# Patient Record
Sex: MALE | Race: ASIAN | NOT HISPANIC OR LATINO | URBAN - METROPOLITAN AREA
[De-identification: names, ages, dates, MRNs, and addresses within clinical notes are randomized per-mention and may not be internally consistent; named-entity substitution may affect disease eponyms.]

---

## 2020-01-01 ENCOUNTER — INPATIENT (INPATIENT)
Facility: HOSPITAL | Age: 75
LOS: 2 days | DRG: 871 | End: 2020-04-20
Attending: INTERNAL MEDICINE | Admitting: INTERNAL MEDICINE
Payer: MEDICARE

## 2020-01-01 ENCOUNTER — INPATIENT (INPATIENT)
Facility: HOSPITAL | Age: 75
LOS: 11 days | Discharge: ANOTHER HOSPITAL | End: 2020-04-16
Attending: EMERGENCY MEDICINE | Admitting: INTERNAL MEDICINE
Payer: MEDICARE

## 2020-01-01 VITALS
OXYGEN SATURATION: 97 % | HEIGHT: 68 IN | RESPIRATION RATE: 26 BRPM | SYSTOLIC BLOOD PRESSURE: 142 MMHG | WEIGHT: 160.06 LBS | TEMPERATURE: 100 F | HEART RATE: 113 BPM | DIASTOLIC BLOOD PRESSURE: 86 MMHG

## 2020-01-01 VITALS
DIASTOLIC BLOOD PRESSURE: 71 MMHG | RESPIRATION RATE: 20 BRPM | OXYGEN SATURATION: 98 % | SYSTOLIC BLOOD PRESSURE: 151 MMHG | HEART RATE: 90 BPM

## 2020-01-01 VITALS — SYSTOLIC BLOOD PRESSURE: 97 MMHG | TEMPERATURE: 101 F | DIASTOLIC BLOOD PRESSURE: 43 MMHG | HEART RATE: 134 BPM

## 2020-01-01 VITALS
HEART RATE: 91 BPM | RESPIRATION RATE: 20 BRPM | OXYGEN SATURATION: 96 % | SYSTOLIC BLOOD PRESSURE: 104 MMHG | DIASTOLIC BLOOD PRESSURE: 60 MMHG

## 2020-01-01 DIAGNOSIS — E03.9 HYPOTHYROIDISM, UNSPECIFIED: ICD-10-CM

## 2020-01-01 DIAGNOSIS — R65.21 SEVERE SEPSIS WITH SEPTIC SHOCK: ICD-10-CM

## 2020-01-01 DIAGNOSIS — A41.9 SEPSIS, UNSPECIFIED ORGANISM: ICD-10-CM

## 2020-01-01 DIAGNOSIS — U07.1 COVID-19: ICD-10-CM

## 2020-01-01 DIAGNOSIS — E87.1 HYPO-OSMOLALITY AND HYPONATREMIA: ICD-10-CM

## 2020-01-01 DIAGNOSIS — J12.89 OTHER VIRAL PNEUMONIA: ICD-10-CM

## 2020-01-01 DIAGNOSIS — J80 ACUTE RESPIRATORY DISTRESS SYNDROME: ICD-10-CM

## 2020-01-01 DIAGNOSIS — E87.0 HYPEROSMOLALITY AND HYPERNATREMIA: ICD-10-CM

## 2020-01-01 DIAGNOSIS — E78.5 HYPERLIPIDEMIA, UNSPECIFIED: ICD-10-CM

## 2020-01-01 DIAGNOSIS — N17.0 ACUTE KIDNEY FAILURE WITH TUBULAR NECROSIS: ICD-10-CM

## 2020-01-01 DIAGNOSIS — E87.3 ALKALOSIS: ICD-10-CM

## 2020-01-01 DIAGNOSIS — J18.9 PNEUMONIA, UNSPECIFIED ORGANISM: ICD-10-CM

## 2020-01-01 DIAGNOSIS — R73.9 HYPERGLYCEMIA, UNSPECIFIED: ICD-10-CM

## 2020-01-01 DIAGNOSIS — Z29.9 ENCOUNTER FOR PROPHYLACTIC MEASURES, UNSPECIFIED: ICD-10-CM

## 2020-01-01 DIAGNOSIS — E44.0 MODERATE PROTEIN-CALORIE MALNUTRITION: ICD-10-CM

## 2020-01-01 DIAGNOSIS — Z78.1 PHYSICAL RESTRAINT STATUS: ICD-10-CM

## 2020-01-01 LAB
ALBUMIN SERPL ELPH-MCNC: 2.2 G/DL — LOW (ref 3.3–5)
ALBUMIN SERPL ELPH-MCNC: 2.5 G/DL — LOW (ref 3.3–5)
ALBUMIN SERPL ELPH-MCNC: 2.6 G/DL — LOW (ref 3.3–5)
ALBUMIN SERPL ELPH-MCNC: 2.7 G/DL — LOW (ref 3.3–5)
ALBUMIN SERPL ELPH-MCNC: 3.2 G/DL — LOW (ref 3.3–5)
ALP SERPL-CCNC: 106 U/L — SIGNIFICANT CHANGE UP (ref 40–120)
ALP SERPL-CCNC: 44 U/L — SIGNIFICANT CHANGE UP (ref 40–120)
ALP SERPL-CCNC: 60 U/L — SIGNIFICANT CHANGE UP (ref 40–120)
ALP SERPL-CCNC: 66 U/L — SIGNIFICANT CHANGE UP (ref 40–120)
ALP SERPL-CCNC: 66 U/L — SIGNIFICANT CHANGE UP (ref 40–120)
ALP SERPL-CCNC: 67 U/L — SIGNIFICANT CHANGE UP (ref 40–120)
ALP SERPL-CCNC: 72 U/L — SIGNIFICANT CHANGE UP (ref 40–120)
ALP SERPL-CCNC: 82 U/L — SIGNIFICANT CHANGE UP (ref 40–120)
ALP SERPL-CCNC: 94 U/L — SIGNIFICANT CHANGE UP (ref 40–120)
ALT FLD-CCNC: 23 U/L — SIGNIFICANT CHANGE UP (ref 12–78)
ALT FLD-CCNC: 34 U/L — SIGNIFICANT CHANGE UP (ref 12–78)
ALT FLD-CCNC: 34 U/L — SIGNIFICANT CHANGE UP (ref 12–78)
ALT FLD-CCNC: 38 U/L — SIGNIFICANT CHANGE UP (ref 12–78)
ALT FLD-CCNC: 38 U/L — SIGNIFICANT CHANGE UP (ref 12–78)
ALT FLD-CCNC: 42 U/L — SIGNIFICANT CHANGE UP (ref 12–78)
ALT FLD-CCNC: 47 U/L — SIGNIFICANT CHANGE UP (ref 12–78)
ALT FLD-CCNC: 55 U/L — SIGNIFICANT CHANGE UP (ref 12–78)
ALT FLD-CCNC: 62 U/L — SIGNIFICANT CHANGE UP (ref 12–78)
ANION GAP SERPL CALC-SCNC: 10 MMOL/L — SIGNIFICANT CHANGE UP (ref 5–17)
ANION GAP SERPL CALC-SCNC: 11 MMOL/L — SIGNIFICANT CHANGE UP (ref 5–17)
ANION GAP SERPL CALC-SCNC: 12 MMOL/L — SIGNIFICANT CHANGE UP (ref 5–17)
ANION GAP SERPL CALC-SCNC: 12 MMOL/L — SIGNIFICANT CHANGE UP (ref 5–17)
ANION GAP SERPL CALC-SCNC: 7 MMOL/L — SIGNIFICANT CHANGE UP (ref 5–17)
ANION GAP SERPL CALC-SCNC: 8 MMOL/L — SIGNIFICANT CHANGE UP (ref 5–17)
ANION GAP SERPL CALC-SCNC: 8 MMOL/L — SIGNIFICANT CHANGE UP (ref 5–17)
ANION GAP SERPL CALC-SCNC: 9 MMOL/L — SIGNIFICANT CHANGE UP (ref 5–17)
APPEARANCE UR: CLEAR — SIGNIFICANT CHANGE UP
APTT BLD: 25.4 SEC — LOW (ref 28.5–37)
APTT BLD: 30 SEC — SIGNIFICANT CHANGE UP (ref 28.5–37)
AST SERPL-CCNC: 26 U/L — SIGNIFICANT CHANGE UP (ref 15–37)
AST SERPL-CCNC: 28 U/L — SIGNIFICANT CHANGE UP (ref 15–37)
AST SERPL-CCNC: 32 U/L — SIGNIFICANT CHANGE UP (ref 15–37)
AST SERPL-CCNC: 34 U/L — SIGNIFICANT CHANGE UP (ref 15–37)
AST SERPL-CCNC: 36 U/L — SIGNIFICANT CHANGE UP (ref 15–37)
AST SERPL-CCNC: 43 U/L — HIGH (ref 15–37)
AST SERPL-CCNC: 48 U/L — HIGH (ref 15–37)
AST SERPL-CCNC: 56 U/L — HIGH (ref 15–37)
AST SERPL-CCNC: 57 U/L — HIGH (ref 15–37)
BACTERIA # UR AUTO: ABNORMAL
BASE EXCESS BLDA CALC-SCNC: -0.3 MMOL/L — SIGNIFICANT CHANGE UP (ref -2–2)
BASE EXCESS BLDA CALC-SCNC: -5.3 MMOL/L — LOW (ref -2–2)
BASE EXCESS BLDA CALC-SCNC: 1.9 MMOL/L — SIGNIFICANT CHANGE UP (ref -2–2)
BASE EXCESS BLDA CALC-SCNC: 3.4 MMOL/L — HIGH (ref -2–2)
BASE EXCESS BLDA CALC-SCNC: 4.7 MMOL/L — HIGH (ref -2–2)
BASE EXCESS BLDA CALC-SCNC: 4.8 MMOL/L — HIGH (ref -2–2)
BASE EXCESS BLDA CALC-SCNC: 5 MMOL/L — HIGH (ref -2–2)
BASE EXCESS BLDA CALC-SCNC: 8.8 MMOL/L — HIGH (ref -2–2)
BASE EXCESS BLDA CALC-SCNC: 8.9 MMOL/L — HIGH (ref -2–2)
BASOPHILS # BLD AUTO: 0 K/UL — SIGNIFICANT CHANGE UP (ref 0–0.2)
BASOPHILS # BLD AUTO: 0.01 K/UL — SIGNIFICANT CHANGE UP (ref 0–0.2)
BASOPHILS # BLD AUTO: 0.03 K/UL — SIGNIFICANT CHANGE UP (ref 0–0.2)
BASOPHILS # BLD AUTO: 0.03 K/UL — SIGNIFICANT CHANGE UP (ref 0–0.2)
BASOPHILS # BLD AUTO: 0.04 K/UL — SIGNIFICANT CHANGE UP (ref 0–0.2)
BASOPHILS # BLD AUTO: 0.04 K/UL — SIGNIFICANT CHANGE UP (ref 0–0.2)
BASOPHILS # BLD AUTO: 0.06 K/UL — SIGNIFICANT CHANGE UP (ref 0–0.2)
BASOPHILS # BLD AUTO: 0.23 K/UL — HIGH (ref 0–0.2)
BASOPHILS NFR BLD AUTO: 0 % — SIGNIFICANT CHANGE UP (ref 0–2)
BASOPHILS NFR BLD AUTO: 0.1 % — SIGNIFICANT CHANGE UP (ref 0–2)
BASOPHILS NFR BLD AUTO: 0.1 % — SIGNIFICANT CHANGE UP (ref 0–2)
BASOPHILS NFR BLD AUTO: 0.2 % — SIGNIFICANT CHANGE UP (ref 0–2)
BASOPHILS NFR BLD AUTO: 0.3 % — SIGNIFICANT CHANGE UP (ref 0–2)
BASOPHILS NFR BLD AUTO: 1.1 % — SIGNIFICANT CHANGE UP (ref 0–2)
BILIRUB DIRECT SERPL-MCNC: 0.14 MG/DL — SIGNIFICANT CHANGE UP (ref 0.05–0.2)
BILIRUB DIRECT SERPL-MCNC: 0.19 MG/DL — SIGNIFICANT CHANGE UP (ref 0.05–0.2)
BILIRUB INDIRECT FLD-MCNC: 0.3 MG/DL — SIGNIFICANT CHANGE UP (ref 0.2–1)
BILIRUB INDIRECT FLD-MCNC: 0.5 MG/DL — SIGNIFICANT CHANGE UP (ref 0.2–1)
BILIRUB SERPL-MCNC: 0.4 MG/DL — SIGNIFICANT CHANGE UP (ref 0.2–1.2)
BILIRUB SERPL-MCNC: 0.4 MG/DL — SIGNIFICANT CHANGE UP (ref 0.2–1.2)
BILIRUB SERPL-MCNC: 0.5 MG/DL — SIGNIFICANT CHANGE UP (ref 0.2–1.2)
BILIRUB SERPL-MCNC: 0.6 MG/DL — SIGNIFICANT CHANGE UP (ref 0.2–1.2)
BILIRUB SERPL-MCNC: 0.6 MG/DL — SIGNIFICANT CHANGE UP (ref 0.2–1.2)
BILIRUB SERPL-MCNC: 1 MG/DL — SIGNIFICANT CHANGE UP (ref 0.2–1.2)
BILIRUB SERPL-MCNC: 1.9 MG/DL — HIGH (ref 0.2–1.2)
BILIRUB UR-MCNC: NEGATIVE — SIGNIFICANT CHANGE UP
BLOOD GAS COMMENTS ARTERIAL: SIGNIFICANT CHANGE UP
BLOOD GAS COMMENTS: SIGNIFICANT CHANGE UP
BLOOD GAS SOURCE: SIGNIFICANT CHANGE UP
BUN SERPL-MCNC: 114 MG/DL — HIGH (ref 7–23)
BUN SERPL-MCNC: 137 MG/DL — HIGH (ref 7–23)
BUN SERPL-MCNC: 14 MG/DL — SIGNIFICANT CHANGE UP (ref 7–23)
BUN SERPL-MCNC: 15 MG/DL — SIGNIFICANT CHANGE UP (ref 7–23)
BUN SERPL-MCNC: 161 MG/DL — HIGH (ref 7–23)
BUN SERPL-MCNC: 178 MG/DL — SIGNIFICANT CHANGE UP (ref 7–23)
BUN SERPL-MCNC: 22 MG/DL — SIGNIFICANT CHANGE UP (ref 7–23)
BUN SERPL-MCNC: 23 MG/DL — SIGNIFICANT CHANGE UP (ref 7–23)
BUN SERPL-MCNC: 23 MG/DL — SIGNIFICANT CHANGE UP (ref 7–23)
BUN SERPL-MCNC: 25 MG/DL — HIGH (ref 7–23)
BUN SERPL-MCNC: 37 MG/DL — HIGH (ref 7–23)
BUN SERPL-MCNC: 55 MG/DL — HIGH (ref 7–23)
BUN SERPL-MCNC: 81 MG/DL — HIGH (ref 7–23)
CALCIUM SERPL-MCNC: 7.1 MG/DL — LOW (ref 8.5–10.1)
CALCIUM SERPL-MCNC: 7.5 MG/DL — LOW (ref 8.5–10.1)
CALCIUM SERPL-MCNC: 7.6 MG/DL — LOW (ref 8.5–10.1)
CALCIUM SERPL-MCNC: 7.7 MG/DL — LOW (ref 8.5–10.1)
CALCIUM SERPL-MCNC: 7.8 MG/DL — LOW (ref 8.5–10.1)
CALCIUM SERPL-MCNC: 7.8 MG/DL — LOW (ref 8.5–10.1)
CALCIUM SERPL-MCNC: 7.9 MG/DL — LOW (ref 8.5–10.1)
CALCIUM SERPL-MCNC: 8 MG/DL — LOW (ref 8.5–10.1)
CALCIUM SERPL-MCNC: 8 MG/DL — LOW (ref 8.5–10.1)
CALCIUM SERPL-MCNC: 8.1 MG/DL — LOW (ref 8.5–10.1)
CALCIUM SERPL-MCNC: 8.2 MG/DL — LOW (ref 8.5–10.1)
CALCIUM SERPL-MCNC: 8.2 MG/DL — LOW (ref 8.5–10.1)
CALCIUM SERPL-MCNC: 8.6 MG/DL — SIGNIFICANT CHANGE UP (ref 8.5–10.1)
CHLORIDE SERPL-SCNC: 94 MMOL/L — LOW (ref 96–108)
CHLORIDE SERPL-SCNC: 94 MMOL/L — LOW (ref 96–108)
CHLORIDE SERPL-SCNC: 95 MMOL/L — LOW (ref 96–108)
CHLORIDE SERPL-SCNC: 96 MMOL/L — SIGNIFICANT CHANGE UP (ref 96–108)
CHLORIDE SERPL-SCNC: 97 MMOL/L — SIGNIFICANT CHANGE UP (ref 96–108)
CHLORIDE SERPL-SCNC: 97 MMOL/L — SIGNIFICANT CHANGE UP (ref 96–108)
CHLORIDE SERPL-SCNC: 98 MMOL/L — SIGNIFICANT CHANGE UP (ref 96–108)
CHLORIDE SERPL-SCNC: 99 MMOL/L — SIGNIFICANT CHANGE UP (ref 96–108)
CHOLEST SERPL-MCNC: 103 MG/DL — SIGNIFICANT CHANGE UP (ref 10–199)
CHOLEST SERPL-MCNC: 117 MG/DL — SIGNIFICANT CHANGE UP (ref 10–199)
CK SERPL-CCNC: 192 U/L — SIGNIFICANT CHANGE UP (ref 26–308)
CK SERPL-CCNC: 91 U/L — SIGNIFICANT CHANGE UP (ref 26–308)
CO2 SERPL-SCNC: 22 MMOL/L — SIGNIFICANT CHANGE UP (ref 22–31)
CO2 SERPL-SCNC: 24 MMOL/L — SIGNIFICANT CHANGE UP (ref 22–31)
CO2 SERPL-SCNC: 24 MMOL/L — SIGNIFICANT CHANGE UP (ref 22–31)
CO2 SERPL-SCNC: 25 MMOL/L — SIGNIFICANT CHANGE UP (ref 22–31)
CO2 SERPL-SCNC: 25 MMOL/L — SIGNIFICANT CHANGE UP (ref 22–31)
CO2 SERPL-SCNC: 26 MMOL/L — SIGNIFICANT CHANGE UP (ref 22–31)
CO2 SERPL-SCNC: 29 MMOL/L — SIGNIFICANT CHANGE UP (ref 22–31)
CO2 SERPL-SCNC: 30 MMOL/L — SIGNIFICANT CHANGE UP (ref 22–31)
CO2 SERPL-SCNC: 32 MMOL/L — HIGH (ref 22–31)
CO2 SERPL-SCNC: 33 MMOL/L — HIGH (ref 22–31)
CO2 SERPL-SCNC: 34 MMOL/L — HIGH (ref 22–31)
CO2 SERPL-SCNC: 34 MMOL/L — HIGH (ref 22–31)
CO2 SERPL-SCNC: 35 MMOL/L — HIGH (ref 22–31)
COLOR SPEC: YELLOW — SIGNIFICANT CHANGE UP
CREAT SERPL-MCNC: 1 MG/DL — SIGNIFICANT CHANGE UP (ref 0.5–1.3)
CREAT SERPL-MCNC: 1.01 MG/DL — SIGNIFICANT CHANGE UP (ref 0.5–1.3)
CREAT SERPL-MCNC: 1.12 MG/DL — SIGNIFICANT CHANGE UP (ref 0.5–1.3)
CREAT SERPL-MCNC: 1.13 MG/DL — SIGNIFICANT CHANGE UP (ref 0.5–1.3)
CREAT SERPL-MCNC: 1.19 MG/DL — SIGNIFICANT CHANGE UP (ref 0.5–1.3)
CREAT SERPL-MCNC: 1.35 MG/DL — HIGH (ref 0.5–1.3)
CREAT SERPL-MCNC: 1.61 MG/DL — HIGH (ref 0.5–1.3)
CREAT SERPL-MCNC: 2.42 MG/DL — HIGH (ref 0.5–1.3)
CREAT SERPL-MCNC: 3.27 MG/DL — HIGH (ref 0.5–1.3)
CREAT SERPL-MCNC: 3.42 MG/DL — HIGH (ref 0.5–1.3)
CREAT SERPL-MCNC: 3.59 MG/DL — HIGH (ref 0.5–1.3)
CREAT SERPL-MCNC: 3.76 MG/DL — HIGH (ref 0.5–1.3)
CREAT SERPL-MCNC: 4.24 MG/DL — HIGH (ref 0.5–1.3)
CRP SERPL-MCNC: 0.79 MG/DL — HIGH (ref 0–0.4)
CRP SERPL-MCNC: 1.3 MG/DL — HIGH (ref 0–0.4)
CRP SERPL-MCNC: 2.17 MG/DL — HIGH (ref 0–0.4)
CRP SERPL-MCNC: 3.25 MG/DL — HIGH (ref 0–0.4)
CRP SERPL-MCNC: 4.59 MG/DL — HIGH (ref 0–0.4)
CULTURE RESULTS: SIGNIFICANT CHANGE UP
D DIMER BLD IA.RAPID-MCNC: 174 NG/ML DDU — SIGNIFICANT CHANGE UP
D DIMER BLD IA.RAPID-MCNC: 484 NG/ML DDU — HIGH
D DIMER BLD IA.RAPID-MCNC: 632 NG/ML DDU — HIGH
D DIMER BLD IA.RAPID-MCNC: <150 NG/ML DDU — SIGNIFICANT CHANGE UP
DIFF PNL FLD: ABNORMAL
EOSINOPHIL # BLD AUTO: 0 K/UL — SIGNIFICANT CHANGE UP (ref 0–0.5)
EOSINOPHIL # BLD AUTO: 0.01 K/UL — SIGNIFICANT CHANGE UP (ref 0–0.5)
EOSINOPHIL NFR BLD AUTO: 0 % — SIGNIFICANT CHANGE UP (ref 0–6)
EOSINOPHIL NFR BLD AUTO: 0.1 % — SIGNIFICANT CHANGE UP (ref 0–6)
EPI CELLS # UR: SIGNIFICANT CHANGE UP
ERYTHROCYTE [SEDIMENTATION RATE] IN BLOOD: 39 MM/HR — HIGH (ref 0–20)
ERYTHROCYTE [SEDIMENTATION RATE] IN BLOOD: 44 MM/HR — HIGH (ref 0–20)
ERYTHROCYTE [SEDIMENTATION RATE] IN BLOOD: 45 MM/HR — HIGH (ref 0–20)
ERYTHROCYTE [SEDIMENTATION RATE] IN BLOOD: 46 MM/HR — HIGH (ref 0–20)
FERRITIN SERPL-MCNC: 1036 NG/ML — HIGH (ref 30–400)
FERRITIN SERPL-MCNC: 1112 NG/ML — HIGH (ref 30–400)
FERRITIN SERPL-MCNC: 1340 NG/ML — HIGH (ref 30–400)
FERRITIN SERPL-MCNC: 1494 NG/ML — HIGH (ref 30–400)
FERRITIN SERPL-MCNC: 1622 NG/ML — HIGH (ref 30–400)
FERRITIN SERPL-MCNC: 1725 NG/ML — HIGH (ref 30–400)
FERRITIN SERPL-MCNC: 512 NG/ML — HIGH (ref 30–400)
FERRITIN SERPL-MCNC: 934 NG/ML — HIGH (ref 30–400)
FIBRINOGEN AG PPP IA-MCNC: 312 MG/DL — SIGNIFICANT CHANGE UP
FLU A RESULT: SIGNIFICANT CHANGE UP
FLU A RESULT: SIGNIFICANT CHANGE UP
FLUAV AG NPH QL: SIGNIFICANT CHANGE UP
FLUBV AG NPH QL: SIGNIFICANT CHANGE UP
GAS PNL BLDA: SIGNIFICANT CHANGE UP
GLUCOSE BLDC GLUCOMTR-MCNC: 149 MG/DL — HIGH (ref 70–99)
GLUCOSE BLDC GLUCOMTR-MCNC: 184 MG/DL — HIGH (ref 70–99)
GLUCOSE BLDC GLUCOMTR-MCNC: 191 MG/DL — HIGH (ref 70–99)
GLUCOSE BLDC GLUCOMTR-MCNC: 207 MG/DL — HIGH (ref 70–99)
GLUCOSE BLDC GLUCOMTR-MCNC: 211 MG/DL — HIGH (ref 70–99)
GLUCOSE BLDC GLUCOMTR-MCNC: 212 MG/DL — HIGH (ref 70–99)
GLUCOSE BLDC GLUCOMTR-MCNC: 218 MG/DL — HIGH (ref 70–99)
GLUCOSE BLDC GLUCOMTR-MCNC: 238 MG/DL — HIGH (ref 70–99)
GLUCOSE BLDC GLUCOMTR-MCNC: 275 MG/DL — HIGH (ref 70–99)
GLUCOSE BLDC GLUCOMTR-MCNC: 278 MG/DL — HIGH (ref 70–99)
GLUCOSE BLDC GLUCOMTR-MCNC: 281 MG/DL — HIGH (ref 70–99)
GLUCOSE BLDC GLUCOMTR-MCNC: 283 MG/DL — HIGH (ref 70–99)
GLUCOSE BLDC GLUCOMTR-MCNC: 286 MG/DL — HIGH (ref 70–99)
GLUCOSE BLDC GLUCOMTR-MCNC: 287 MG/DL — HIGH (ref 70–99)
GLUCOSE BLDC GLUCOMTR-MCNC: 289 MG/DL — HIGH (ref 70–99)
GLUCOSE BLDC GLUCOMTR-MCNC: 289 MG/DL — HIGH (ref 70–99)
GLUCOSE BLDC GLUCOMTR-MCNC: 293 MG/DL — HIGH (ref 70–99)
GLUCOSE BLDC GLUCOMTR-MCNC: 298 MG/DL — HIGH (ref 70–99)
GLUCOSE BLDC GLUCOMTR-MCNC: 301 MG/DL — HIGH (ref 70–99)
GLUCOSE BLDC GLUCOMTR-MCNC: 302 MG/DL — HIGH (ref 70–99)
GLUCOSE BLDC GLUCOMTR-MCNC: 341 MG/DL — HIGH (ref 70–99)
GLUCOSE BLDC GLUCOMTR-MCNC: 341 MG/DL — HIGH (ref 70–99)
GLUCOSE BLDC GLUCOMTR-MCNC: 343 MG/DL — HIGH (ref 70–99)
GLUCOSE BLDC GLUCOMTR-MCNC: 356 MG/DL — HIGH (ref 70–99)
GLUCOSE BLDC GLUCOMTR-MCNC: 365 MG/DL — HIGH (ref 70–99)
GLUCOSE SERPL-MCNC: 139 MG/DL — HIGH (ref 70–99)
GLUCOSE SERPL-MCNC: 182 MG/DL — HIGH (ref 70–99)
GLUCOSE SERPL-MCNC: 201 MG/DL — HIGH (ref 70–99)
GLUCOSE SERPL-MCNC: 223 MG/DL — HIGH (ref 70–99)
GLUCOSE SERPL-MCNC: 260 MG/DL — HIGH (ref 70–99)
GLUCOSE SERPL-MCNC: 290 MG/DL — HIGH (ref 70–99)
GLUCOSE SERPL-MCNC: 305 MG/DL — HIGH (ref 70–99)
GLUCOSE SERPL-MCNC: 319 MG/DL — HIGH (ref 70–99)
GLUCOSE SERPL-MCNC: 320 MG/DL — HIGH (ref 70–99)
GLUCOSE SERPL-MCNC: 320 MG/DL — HIGH (ref 70–99)
GLUCOSE SERPL-MCNC: 331 MG/DL — HIGH (ref 70–99)
GLUCOSE SERPL-MCNC: 364 MG/DL — HIGH (ref 70–99)
GLUCOSE SERPL-MCNC: 390 MG/DL — HIGH (ref 70–99)
GLUCOSE UR QL: NEGATIVE MG/DL — SIGNIFICANT CHANGE UP
GRAN CASTS # UR COMP ASSIST: ABNORMAL /LPF
HBA1C BLD-MCNC: 7.7 % — HIGH (ref 4–5.6)
HCO3 BLDA-SCNC: 22 MMOL/L — SIGNIFICANT CHANGE UP (ref 21–29)
HCO3 BLDA-SCNC: 24 MMOL/L — SIGNIFICANT CHANGE UP (ref 21–29)
HCO3 BLDA-SCNC: 27 MMOL/L — SIGNIFICANT CHANGE UP (ref 21–29)
HCO3 BLDA-SCNC: 30 MMOL/L — HIGH (ref 21–29)
HCO3 BLDA-SCNC: 30 MMOL/L — HIGH (ref 21–29)
HCO3 BLDA-SCNC: 31 MMOL/L — HIGH (ref 21–29)
HCO3 BLDA-SCNC: 31 MMOL/L — HIGH (ref 21–29)
HCO3 BLDA-SCNC: 33 MMOL/L — HIGH (ref 21–29)
HCO3 BLDA-SCNC: 36 MMOL/L — HIGH (ref 21–29)
HCT VFR BLD CALC: 36 % — LOW (ref 39–50)
HCT VFR BLD CALC: 39.1 % — SIGNIFICANT CHANGE UP (ref 39–50)
HCT VFR BLD CALC: 40.2 % — SIGNIFICANT CHANGE UP (ref 39–50)
HCT VFR BLD CALC: 40.8 % — SIGNIFICANT CHANGE UP (ref 39–50)
HCT VFR BLD CALC: 41 % — SIGNIFICANT CHANGE UP (ref 39–50)
HCT VFR BLD CALC: 41.1 % — SIGNIFICANT CHANGE UP (ref 39–50)
HCT VFR BLD CALC: 41.2 % — SIGNIFICANT CHANGE UP (ref 39–50)
HCT VFR BLD CALC: 41.4 % — SIGNIFICANT CHANGE UP (ref 39–50)
HCT VFR BLD CALC: 41.8 % — SIGNIFICANT CHANGE UP (ref 39–50)
HCT VFR BLD CALC: 42.7 % — SIGNIFICANT CHANGE UP (ref 39–50)
HCT VFR BLD CALC: 42.8 % — SIGNIFICANT CHANGE UP (ref 39–50)
HCT VFR BLD CALC: 42.9 % — SIGNIFICANT CHANGE UP (ref 39–50)
HCT VFR BLD CALC: 42.9 % — SIGNIFICANT CHANGE UP (ref 39–50)
HCT VFR BLD CALC: 44.5 % — SIGNIFICANT CHANGE UP (ref 39–50)
HCV AB S/CO SERPL IA: 0.21 S/CO — SIGNIFICANT CHANGE UP (ref 0–0.99)
HCV AB SERPL-IMP: SIGNIFICANT CHANGE UP
HDLC SERPL-MCNC: 30 MG/DL — LOW
HDLC SERPL-MCNC: 32 MG/DL — LOW
HGB BLD-MCNC: 11.8 G/DL — LOW (ref 13–17)
HGB BLD-MCNC: 13.1 G/DL — SIGNIFICANT CHANGE UP (ref 13–17)
HGB BLD-MCNC: 13.2 G/DL — SIGNIFICANT CHANGE UP (ref 13–17)
HGB BLD-MCNC: 13.4 G/DL — SIGNIFICANT CHANGE UP (ref 13–17)
HGB BLD-MCNC: 13.5 G/DL — SIGNIFICANT CHANGE UP (ref 13–17)
HGB BLD-MCNC: 13.6 G/DL — SIGNIFICANT CHANGE UP (ref 13–17)
HGB BLD-MCNC: 13.7 G/DL — SIGNIFICANT CHANGE UP (ref 13–17)
HGB BLD-MCNC: 13.8 G/DL — SIGNIFICANT CHANGE UP (ref 13–17)
HGB BLD-MCNC: 13.8 G/DL — SIGNIFICANT CHANGE UP (ref 13–17)
HGB BLD-MCNC: 13.9 G/DL — SIGNIFICANT CHANGE UP (ref 13–17)
HGB BLD-MCNC: 14.2 G/DL — SIGNIFICANT CHANGE UP (ref 13–17)
HGB BLD-MCNC: 14.4 G/DL — SIGNIFICANT CHANGE UP (ref 13–17)
HGB BLD-MCNC: 14.6 G/DL — SIGNIFICANT CHANGE UP (ref 13–17)
HGB BLD-MCNC: 14.7 G/DL — SIGNIFICANT CHANGE UP (ref 13–17)
HIV 1 & 2 AB SERPL IA.RAPID: SIGNIFICANT CHANGE UP
HOROWITZ INDEX BLDA+IHG-RTO: 100 — SIGNIFICANT CHANGE UP
HOROWITZ INDEX BLDA+IHG-RTO: 50 — SIGNIFICANT CHANGE UP
HOROWITZ INDEX BLDA+IHG-RTO: 80 — SIGNIFICANT CHANGE UP
HOROWITZ INDEX BLDA+IHG-RTO: SIGNIFICANT CHANGE UP
IMM GRANULOCYTES NFR BLD AUTO: 0.3 % — SIGNIFICANT CHANGE UP (ref 0–1.5)
IMM GRANULOCYTES NFR BLD AUTO: 0.4 % — SIGNIFICANT CHANGE UP (ref 0–1.5)
IMM GRANULOCYTES NFR BLD AUTO: 0.9 % — SIGNIFICANT CHANGE UP (ref 0–1.5)
IMM GRANULOCYTES NFR BLD AUTO: 1.9 % — HIGH (ref 0–1.5)
IMM GRANULOCYTES NFR BLD AUTO: 2.2 % — HIGH (ref 0–1.5)
IMM GRANULOCYTES NFR BLD AUTO: 2.3 % — HIGH (ref 0–1.5)
IMM GRANULOCYTES NFR BLD AUTO: 2.9 % — HIGH (ref 0–1.5)
IMM GRANULOCYTES NFR BLD AUTO: 4.5 % — HIGH (ref 0–1.5)
IMM GRANULOCYTES NFR BLD AUTO: 4.7 % — HIGH (ref 0–1.5)
INR BLD: 1.05 RATIO — SIGNIFICANT CHANGE UP (ref 0.88–1.16)
INR BLD: 1.19 RATIO — HIGH (ref 0.88–1.16)
KETONES UR-MCNC: NEGATIVE — SIGNIFICANT CHANGE UP
LACTATE SERPL-SCNC: 1.7 MMOL/L — SIGNIFICANT CHANGE UP (ref 0.7–2)
LACTATE SERPL-SCNC: 2.5 MMOL/L — HIGH (ref 0.7–2)
LDH SERPL L TO P-CCNC: 300 U/L — HIGH (ref 50–242)
LDH SERPL L TO P-CCNC: 427 U/L — HIGH (ref 50–242)
LDH SERPL L TO P-CCNC: 520 U/L — HIGH (ref 50–242)
LDH SERPL L TO P-CCNC: 560 U/L — HIGH (ref 50–242)
LDH SERPL L TO P-CCNC: 742 U/L — HIGH (ref 50–242)
LDH SERPL L TO P-CCNC: 941 U/L — HIGH (ref 50–242)
LEUKOCYTE ESTERASE UR-ACNC: NEGATIVE — SIGNIFICANT CHANGE UP
LIPID PNL WITH DIRECT LDL SERPL: 42 MG/DL — SIGNIFICANT CHANGE UP
LIPID PNL WITH DIRECT LDL SERPL: 55 MG/DL — SIGNIFICANT CHANGE UP
LYMPHOCYTES # BLD AUTO: 0.65 K/UL — LOW (ref 1–3.3)
LYMPHOCYTES # BLD AUTO: 0.76 K/UL — LOW (ref 1–3.3)
LYMPHOCYTES # BLD AUTO: 0.76 K/UL — LOW (ref 1–3.3)
LYMPHOCYTES # BLD AUTO: 0.84 K/UL — LOW (ref 1–3.3)
LYMPHOCYTES # BLD AUTO: 0.85 K/UL — LOW (ref 1–3.3)
LYMPHOCYTES # BLD AUTO: 1.08 K/UL — SIGNIFICANT CHANGE UP (ref 1–3.3)
LYMPHOCYTES # BLD AUTO: 1.26 K/UL — SIGNIFICANT CHANGE UP (ref 1–3.3)
LYMPHOCYTES # BLD AUTO: 1.27 K/UL — SIGNIFICANT CHANGE UP (ref 1–3.3)
LYMPHOCYTES # BLD AUTO: 1.31 K/UL — SIGNIFICANT CHANGE UP (ref 1–3.3)
LYMPHOCYTES # BLD AUTO: 1.71 K/UL — SIGNIFICANT CHANGE UP (ref 1–3.3)
LYMPHOCYTES # BLD AUTO: 23.5 % — SIGNIFICANT CHANGE UP (ref 13–44)
LYMPHOCYTES # BLD AUTO: 3.6 % — LOW (ref 13–44)
LYMPHOCYTES # BLD AUTO: 5 % — LOW (ref 13–44)
LYMPHOCYTES # BLD AUTO: 6 % — LOW (ref 13–44)
LYMPHOCYTES # BLD AUTO: 7 % — LOW (ref 13–44)
LYMPHOCYTES # BLD AUTO: 7.2 % — LOW (ref 13–44)
LYMPHOCYTES # BLD AUTO: 7.2 % — LOW (ref 13–44)
LYMPHOCYTES # BLD AUTO: 7.3 % — LOW (ref 13–44)
LYMPHOCYTES # BLD AUTO: 7.4 % — LOW (ref 13–44)
LYMPHOCYTES # BLD AUTO: 8.4 % — LOW (ref 13–44)
MAGNESIUM SERPL-MCNC: 2.8 MG/DL — HIGH (ref 1.6–2.6)
MAGNESIUM SERPL-MCNC: 2.9 MG/DL — HIGH (ref 1.6–2.6)
MAGNESIUM SERPL-MCNC: 3.2 MG/DL — HIGH (ref 1.6–2.6)
MAGNESIUM SERPL-MCNC: 3.3 MG/DL — HIGH (ref 1.6–2.6)
MAGNESIUM SERPL-MCNC: 3.3 MG/DL — HIGH (ref 1.6–2.6)
MAGNESIUM SERPL-MCNC: 3.5 MG/DL — HIGH (ref 1.6–2.6)
MAGNESIUM SERPL-MCNC: 3.5 MG/DL — HIGH (ref 1.6–2.6)
MAGNESIUM SERPL-MCNC: 3.7 MG/DL — HIGH (ref 1.6–2.6)
MAGNESIUM SERPL-MCNC: 4 MG/DL — HIGH (ref 1.6–2.6)
MCHC RBC-ENTMCNC: 28.7 PG — SIGNIFICANT CHANGE UP (ref 27–34)
MCHC RBC-ENTMCNC: 28.8 PG — SIGNIFICANT CHANGE UP (ref 27–34)
MCHC RBC-ENTMCNC: 28.9 PG — SIGNIFICANT CHANGE UP (ref 27–34)
MCHC RBC-ENTMCNC: 29 PG — SIGNIFICANT CHANGE UP (ref 27–34)
MCHC RBC-ENTMCNC: 29.1 PG — SIGNIFICANT CHANGE UP (ref 27–34)
MCHC RBC-ENTMCNC: 29.1 PG — SIGNIFICANT CHANGE UP (ref 27–34)
MCHC RBC-ENTMCNC: 29.2 PG — SIGNIFICANT CHANGE UP (ref 27–34)
MCHC RBC-ENTMCNC: 29.3 PG — SIGNIFICANT CHANGE UP (ref 27–34)
MCHC RBC-ENTMCNC: 29.3 PG — SIGNIFICANT CHANGE UP (ref 27–34)
MCHC RBC-ENTMCNC: 29.4 PG — SIGNIFICANT CHANGE UP (ref 27–34)
MCHC RBC-ENTMCNC: 32 GM/DL — SIGNIFICANT CHANGE UP (ref 32–36)
MCHC RBC-ENTMCNC: 32.4 GM/DL — SIGNIFICANT CHANGE UP (ref 32–36)
MCHC RBC-ENTMCNC: 32.5 GM/DL — SIGNIFICANT CHANGE UP (ref 32–36)
MCHC RBC-ENTMCNC: 32.6 GM/DL — SIGNIFICANT CHANGE UP (ref 32–36)
MCHC RBC-ENTMCNC: 32.8 GM/DL — SIGNIFICANT CHANGE UP (ref 32–36)
MCHC RBC-ENTMCNC: 33.1 GM/DL — SIGNIFICANT CHANGE UP (ref 32–36)
MCHC RBC-ENTMCNC: 33.7 GM/DL — SIGNIFICANT CHANGE UP (ref 32–36)
MCHC RBC-ENTMCNC: 33.7 GM/DL — SIGNIFICANT CHANGE UP (ref 32–36)
MCHC RBC-ENTMCNC: 33.8 GM/DL — SIGNIFICANT CHANGE UP (ref 32–36)
MCHC RBC-ENTMCNC: 33.8 GM/DL — SIGNIFICANT CHANGE UP (ref 32–36)
MCHC RBC-ENTMCNC: 34 GM/DL — SIGNIFICANT CHANGE UP (ref 32–36)
MCHC RBC-ENTMCNC: 34.4 GM/DL — SIGNIFICANT CHANGE UP (ref 32–36)
MCV RBC AUTO: 84.6 FL — SIGNIFICANT CHANGE UP (ref 80–100)
MCV RBC AUTO: 84.9 FL — SIGNIFICANT CHANGE UP (ref 80–100)
MCV RBC AUTO: 85.2 FL — SIGNIFICANT CHANGE UP (ref 80–100)
MCV RBC AUTO: 86.4 FL — SIGNIFICANT CHANGE UP (ref 80–100)
MCV RBC AUTO: 86.5 FL — SIGNIFICANT CHANGE UP (ref 80–100)
MCV RBC AUTO: 86.7 FL — SIGNIFICANT CHANGE UP (ref 80–100)
MCV RBC AUTO: 88.1 FL — SIGNIFICANT CHANGE UP (ref 80–100)
MCV RBC AUTO: 88.3 FL — SIGNIFICANT CHANGE UP (ref 80–100)
MCV RBC AUTO: 88.8 FL — SIGNIFICANT CHANGE UP (ref 80–100)
MCV RBC AUTO: 89 FL — SIGNIFICANT CHANGE UP (ref 80–100)
MCV RBC AUTO: 89.7 FL — SIGNIFICANT CHANGE UP (ref 80–100)
MCV RBC AUTO: 89.8 FL — SIGNIFICANT CHANGE UP (ref 80–100)
MCV RBC AUTO: 89.9 FL — SIGNIFICANT CHANGE UP (ref 80–100)
MCV RBC AUTO: 90.3 FL — SIGNIFICANT CHANGE UP (ref 80–100)
MONOCYTES # BLD AUTO: 0.58 K/UL — SIGNIFICANT CHANGE UP (ref 0–0.9)
MONOCYTES # BLD AUTO: 0.87 K/UL — SIGNIFICANT CHANGE UP (ref 0–0.9)
MONOCYTES # BLD AUTO: 0.92 K/UL — HIGH (ref 0–0.9)
MONOCYTES # BLD AUTO: 0.99 K/UL — HIGH (ref 0–0.9)
MONOCYTES # BLD AUTO: 1.03 K/UL — HIGH (ref 0–0.9)
MONOCYTES # BLD AUTO: 1.22 K/UL — HIGH (ref 0–0.9)
MONOCYTES # BLD AUTO: 1.29 K/UL — HIGH (ref 0–0.9)
MONOCYTES # BLD AUTO: 1.38 K/UL — HIGH (ref 0–0.9)
MONOCYTES # BLD AUTO: 1.48 K/UL — HIGH (ref 0–0.9)
MONOCYTES # BLD AUTO: 3.14 K/UL — HIGH (ref 0–0.9)
MONOCYTES NFR BLD AUTO: 10.8 % — SIGNIFICANT CHANGE UP (ref 2–14)
MONOCYTES NFR BLD AUTO: 11.6 % — SIGNIFICANT CHANGE UP (ref 2–14)
MONOCYTES NFR BLD AUTO: 12 % — SIGNIFICANT CHANGE UP (ref 2–14)
MONOCYTES NFR BLD AUTO: 13.5 % — SIGNIFICANT CHANGE UP (ref 2–14)
MONOCYTES NFR BLD AUTO: 5 % — SIGNIFICANT CHANGE UP (ref 2–14)
MONOCYTES NFR BLD AUTO: 5.2 % — SIGNIFICANT CHANGE UP (ref 2–14)
MONOCYTES NFR BLD AUTO: 6.3 % — SIGNIFICANT CHANGE UP (ref 2–14)
MONOCYTES NFR BLD AUTO: 8.1 % — SIGNIFICANT CHANGE UP (ref 2–14)
MONOCYTES NFR BLD AUTO: 8.4 % — SIGNIFICANT CHANGE UP (ref 2–14)
MONOCYTES NFR BLD AUTO: 8.6 % — SIGNIFICANT CHANGE UP (ref 2–14)
NEUTROPHILS # BLD AUTO: 10 K/UL — HIGH (ref 1.8–7.4)
NEUTROPHILS # BLD AUTO: 10.13 K/UL — HIGH (ref 1.8–7.4)
NEUTROPHILS # BLD AUTO: 12.42 K/UL — HIGH (ref 1.8–7.4)
NEUTROPHILS # BLD AUTO: 14.27 K/UL — HIGH (ref 1.8–7.4)
NEUTROPHILS # BLD AUTO: 15.9 K/UL — HIGH (ref 1.8–7.4)
NEUTROPHILS # BLD AUTO: 16.23 K/UL — HIGH (ref 1.8–7.4)
NEUTROPHILS # BLD AUTO: 21.7 K/UL — HIGH (ref 1.8–7.4)
NEUTROPHILS # BLD AUTO: 3.5 K/UL — SIGNIFICANT CHANGE UP (ref 1.8–7.4)
NEUTROPHILS # BLD AUTO: 7.81 K/UL — HIGH (ref 1.8–7.4)
NEUTROPHILS # BLD AUTO: 9.96 K/UL — HIGH (ref 1.8–7.4)
NEUTROPHILS NFR BLD AUTO: 65.1 % — SIGNIFICANT CHANGE UP (ref 43–77)
NEUTROPHILS NFR BLD AUTO: 76.5 % — SIGNIFICANT CHANGE UP (ref 43–77)
NEUTROPHILS NFR BLD AUTO: 79.3 % — HIGH (ref 43–77)
NEUTROPHILS NFR BLD AUTO: 79.7 % — HIGH (ref 43–77)
NEUTROPHILS NFR BLD AUTO: 81 % — HIGH (ref 43–77)
NEUTROPHILS NFR BLD AUTO: 82.2 % — HIGH (ref 43–77)
NEUTROPHILS NFR BLD AUTO: 82.7 % — HIGH (ref 43–77)
NEUTROPHILS NFR BLD AUTO: 83.4 % — HIGH (ref 43–77)
NEUTROPHILS NFR BLD AUTO: 84 % — HIGH (ref 43–77)
NEUTROPHILS NFR BLD AUTO: 89 % — HIGH (ref 43–77)
NITRITE UR-MCNC: NEGATIVE — SIGNIFICANT CHANGE UP
NRBC # BLD: 0 /100 WBCS — SIGNIFICANT CHANGE UP (ref 0–0)
NRBC # BLD: SIGNIFICANT CHANGE UP /100 WBCS (ref 0–0)
NT-PROBNP SERPL-SCNC: 36 PG/ML — SIGNIFICANT CHANGE UP (ref 0–450)
PCO2 BLDA: 32 MMHG — SIGNIFICANT CHANGE UP (ref 32–46)
PCO2 BLDA: 43 MMHG — SIGNIFICANT CHANGE UP (ref 32–46)
PCO2 BLDA: 45 MMHG — SIGNIFICANT CHANGE UP (ref 32–46)
PCO2 BLDA: 48 MMHG — HIGH (ref 32–46)
PCO2 BLDA: 53 MMHG — HIGH (ref 32–46)
PCO2 BLDA: 56 MMHG — HIGH (ref 32–46)
PCO2 BLDA: 58 MMHG — HIGH (ref 32–46)
PCO2 BLDA: 59 MMHG — HIGH (ref 32–46)
PCO2 BLDA: 70 MMHG — CRITICAL HIGH (ref 32–46)
PH BLD: 7.38 — SIGNIFICANT CHANGE UP (ref 7.35–7.45)
PH BLD: 7.39 — SIGNIFICANT CHANGE UP (ref 7.35–7.45)
PH BLD: 7.4 — SIGNIFICANT CHANGE UP (ref 7.35–7.45)
PH BLD: 7.46 — HIGH (ref 7.35–7.45)
PH BLDA: 7.17 — CRITICAL LOW (ref 7.35–7.45)
PH BLDA: 7.34 — LOW (ref 7.35–7.45)
PH BLDA: 7.36 — SIGNIFICANT CHANGE UP (ref 7.35–7.45)
PH BLDA: 7.41 — SIGNIFICANT CHANGE UP (ref 7.35–7.45)
PH BLDA: 7.48 — HIGH (ref 7.35–7.45)
PH UR: 5 — SIGNIFICANT CHANGE UP (ref 5–8)
PHOSPHATE SERPL-MCNC: 2.7 MG/DL — SIGNIFICANT CHANGE UP (ref 2.5–4.5)
PHOSPHATE SERPL-MCNC: 3.8 MG/DL — SIGNIFICANT CHANGE UP (ref 2.5–4.5)
PHOSPHATE SERPL-MCNC: 5.5 MG/DL — HIGH (ref 2.5–4.5)
PHOSPHATE SERPL-MCNC: 6.2 MG/DL — HIGH (ref 2.5–4.5)
PHOSPHATE SERPL-MCNC: 6.2 MG/DL — HIGH (ref 2.5–4.5)
PHOSPHATE SERPL-MCNC: 8 MG/DL — HIGH (ref 2.5–4.5)
PHOSPHATE SERPL-MCNC: 8.5 MG/DL — HIGH (ref 2.5–4.5)
PHOSPHATE SERPL-MCNC: 8.6 MG/DL — HIGH (ref 2.5–4.5)
PHOSPHATE SERPL-MCNC: 8.9 MG/DL — HIGH (ref 2.5–4.5)
PLATELET # BLD AUTO: 114 K/UL — LOW (ref 150–400)
PLATELET # BLD AUTO: 127 K/UL — LOW (ref 150–400)
PLATELET # BLD AUTO: 132 K/UL — LOW (ref 150–400)
PLATELET # BLD AUTO: 137 K/UL — LOW (ref 150–400)
PLATELET # BLD AUTO: 150 K/UL — SIGNIFICANT CHANGE UP (ref 150–400)
PLATELET # BLD AUTO: 167 K/UL — SIGNIFICANT CHANGE UP (ref 150–400)
PLATELET # BLD AUTO: 181 K/UL — SIGNIFICANT CHANGE UP (ref 150–400)
PLATELET # BLD AUTO: 186 K/UL — SIGNIFICANT CHANGE UP (ref 150–400)
PLATELET # BLD AUTO: 187 K/UL — SIGNIFICANT CHANGE UP (ref 150–400)
PLATELET # BLD AUTO: 204 K/UL — SIGNIFICANT CHANGE UP (ref 150–400)
PLATELET # BLD AUTO: 220 K/UL — SIGNIFICANT CHANGE UP (ref 150–400)
PLATELET # BLD AUTO: 223 K/UL — SIGNIFICANT CHANGE UP (ref 150–400)
PLATELET # BLD AUTO: 231 K/UL — SIGNIFICANT CHANGE UP (ref 150–400)
PLATELET # BLD AUTO: 265 K/UL — SIGNIFICANT CHANGE UP (ref 150–400)
PO2 BLDA: 111 MMHG — HIGH (ref 74–108)
PO2 BLDA: 111 MMHG — HIGH (ref 74–108)
PO2 BLDA: 144 MMHG — HIGH (ref 74–108)
PO2 BLDA: 157 MMHG — HIGH (ref 74–108)
PO2 BLDA: 64 MMHG — LOW (ref 74–108)
PO2 BLDA: 68 MMHG — LOW (ref 74–108)
PO2 BLDA: 78 MMHG — SIGNIFICANT CHANGE UP (ref 74–108)
PO2 BLDA: 87 MMHG — SIGNIFICANT CHANGE UP (ref 74–108)
PO2 BLDA: 95 MMHG — SIGNIFICANT CHANGE UP (ref 74–108)
POTASSIUM SERPL-MCNC: 3.7 MMOL/L — SIGNIFICANT CHANGE UP (ref 3.5–5.3)
POTASSIUM SERPL-MCNC: 4 MMOL/L — SIGNIFICANT CHANGE UP (ref 3.5–5.3)
POTASSIUM SERPL-MCNC: 4.1 MMOL/L — SIGNIFICANT CHANGE UP (ref 3.5–5.3)
POTASSIUM SERPL-MCNC: 4.1 MMOL/L — SIGNIFICANT CHANGE UP (ref 3.5–5.3)
POTASSIUM SERPL-MCNC: 4.2 MMOL/L — SIGNIFICANT CHANGE UP (ref 3.5–5.3)
POTASSIUM SERPL-MCNC: 4.3 MMOL/L — SIGNIFICANT CHANGE UP (ref 3.5–5.3)
POTASSIUM SERPL-MCNC: 4.4 MMOL/L — SIGNIFICANT CHANGE UP (ref 3.5–5.3)
POTASSIUM SERPL-MCNC: 4.5 MMOL/L — SIGNIFICANT CHANGE UP (ref 3.5–5.3)
POTASSIUM SERPL-MCNC: 4.5 MMOL/L — SIGNIFICANT CHANGE UP (ref 3.5–5.3)
POTASSIUM SERPL-MCNC: 4.6 MMOL/L — SIGNIFICANT CHANGE UP (ref 3.5–5.3)
POTASSIUM SERPL-MCNC: 4.7 MMOL/L — SIGNIFICANT CHANGE UP (ref 3.5–5.3)
POTASSIUM SERPL-MCNC: 5.1 MMOL/L — SIGNIFICANT CHANGE UP (ref 3.5–5.3)
POTASSIUM SERPL-MCNC: 5.2 MMOL/L — SIGNIFICANT CHANGE UP (ref 3.5–5.3)
POTASSIUM SERPL-SCNC: 3.7 MMOL/L — SIGNIFICANT CHANGE UP (ref 3.5–5.3)
POTASSIUM SERPL-SCNC: 4 MMOL/L — SIGNIFICANT CHANGE UP (ref 3.5–5.3)
POTASSIUM SERPL-SCNC: 4.1 MMOL/L — SIGNIFICANT CHANGE UP (ref 3.5–5.3)
POTASSIUM SERPL-SCNC: 4.1 MMOL/L — SIGNIFICANT CHANGE UP (ref 3.5–5.3)
POTASSIUM SERPL-SCNC: 4.2 MMOL/L — SIGNIFICANT CHANGE UP (ref 3.5–5.3)
POTASSIUM SERPL-SCNC: 4.3 MMOL/L — SIGNIFICANT CHANGE UP (ref 3.5–5.3)
POTASSIUM SERPL-SCNC: 4.4 MMOL/L — SIGNIFICANT CHANGE UP (ref 3.5–5.3)
POTASSIUM SERPL-SCNC: 4.5 MMOL/L — SIGNIFICANT CHANGE UP (ref 3.5–5.3)
POTASSIUM SERPL-SCNC: 4.5 MMOL/L — SIGNIFICANT CHANGE UP (ref 3.5–5.3)
POTASSIUM SERPL-SCNC: 4.6 MMOL/L — SIGNIFICANT CHANGE UP (ref 3.5–5.3)
POTASSIUM SERPL-SCNC: 4.7 MMOL/L — SIGNIFICANT CHANGE UP (ref 3.5–5.3)
POTASSIUM SERPL-SCNC: 5.1 MMOL/L — SIGNIFICANT CHANGE UP (ref 3.5–5.3)
POTASSIUM SERPL-SCNC: 5.2 MMOL/L — SIGNIFICANT CHANGE UP (ref 3.5–5.3)
PROCALCITONIN SERPL-MCNC: 0.07 NG/ML — SIGNIFICANT CHANGE UP (ref 0.02–0.1)
PROCALCITONIN SERPL-MCNC: 0.15 NG/ML — HIGH (ref 0.02–0.1)
PROT SERPL-MCNC: 6.7 GM/DL — SIGNIFICANT CHANGE UP (ref 6–8.3)
PROT SERPL-MCNC: 6.8 GM/DL — SIGNIFICANT CHANGE UP (ref 6–8.3)
PROT SERPL-MCNC: 7.1 GM/DL — SIGNIFICANT CHANGE UP (ref 6–8.3)
PROT SERPL-MCNC: 7.2 GM/DL — SIGNIFICANT CHANGE UP (ref 6–8.3)
PROT SERPL-MCNC: 7.4 GM/DL — SIGNIFICANT CHANGE UP (ref 6–8.3)
PROT SERPL-MCNC: 7.6 GM/DL — SIGNIFICANT CHANGE UP (ref 6–8.3)
PROT UR-MCNC: 100 MG/DL
PROTHROM AB SERPL-ACNC: 11.8 SEC — SIGNIFICANT CHANGE UP (ref 10–12.9)
PROTHROM AB SERPL-ACNC: 13.4 SEC — HIGH (ref 10–12.9)
RBC # BLD: 4.01 M/UL — LOW (ref 4.2–5.8)
RBC # BLD: 4.47 M/UL — SIGNIFICANT CHANGE UP (ref 4.2–5.8)
RBC # BLD: 4.51 M/UL — SIGNIFICANT CHANGE UP (ref 4.2–5.8)
RBC # BLD: 4.62 M/UL — SIGNIFICANT CHANGE UP (ref 4.2–5.8)
RBC # BLD: 4.66 M/UL — SIGNIFICANT CHANGE UP (ref 4.2–5.8)
RBC # BLD: 4.69 M/UL — SIGNIFICANT CHANGE UP (ref 4.2–5.8)
RBC # BLD: 4.74 M/UL — SIGNIFICANT CHANGE UP (ref 4.2–5.8)
RBC # BLD: 4.74 M/UL — SIGNIFICANT CHANGE UP (ref 4.2–5.8)
RBC # BLD: 4.77 M/UL — SIGNIFICANT CHANGE UP (ref 4.2–5.8)
RBC # BLD: 4.79 M/UL — SIGNIFICANT CHANGE UP (ref 4.2–5.8)
RBC # BLD: 4.87 M/UL — SIGNIFICANT CHANGE UP (ref 4.2–5.8)
RBC # BLD: 5.01 M/UL — SIGNIFICANT CHANGE UP (ref 4.2–5.8)
RBC # BLD: 5.03 M/UL — SIGNIFICANT CHANGE UP (ref 4.2–5.8)
RBC # BLD: 5.07 M/UL — SIGNIFICANT CHANGE UP (ref 4.2–5.8)
RBC # FLD: 12 % — SIGNIFICANT CHANGE UP (ref 10.3–14.5)
RBC # FLD: 12.2 % — SIGNIFICANT CHANGE UP (ref 10.3–14.5)
RBC # FLD: 12.4 % — SIGNIFICANT CHANGE UP (ref 10.3–14.5)
RBC # FLD: 12.5 % — SIGNIFICANT CHANGE UP (ref 10.3–14.5)
RBC # FLD: 12.5 % — SIGNIFICANT CHANGE UP (ref 10.3–14.5)
RBC # FLD: 12.6 % — SIGNIFICANT CHANGE UP (ref 10.3–14.5)
RBC # FLD: 12.7 % — SIGNIFICANT CHANGE UP (ref 10.3–14.5)
RBC # FLD: 12.7 % — SIGNIFICANT CHANGE UP (ref 10.3–14.5)
RBC # FLD: 12.8 % — SIGNIFICANT CHANGE UP (ref 10.3–14.5)
RBC # FLD: 13.1 % — SIGNIFICANT CHANGE UP (ref 10.3–14.5)
RBC # FLD: 13.2 % — SIGNIFICANT CHANGE UP (ref 10.3–14.5)
RBC # FLD: 13.8 % — SIGNIFICANT CHANGE UP (ref 10.3–14.5)
RBC CASTS # UR COMP ASSIST: SIGNIFICANT CHANGE UP /HPF (ref 0–4)
RSV RESULT: SIGNIFICANT CHANGE UP
RSV RNA RESP QL NAA+PROBE: SIGNIFICANT CHANGE UP
SAO2 % BLDA: 59 % — LOW (ref 92–96)
SAO2 % BLDA: 92 % — SIGNIFICANT CHANGE UP (ref 92–96)
SAO2 % BLDA: 93 % — SIGNIFICANT CHANGE UP (ref 92–96)
SAO2 % BLDA: 93 % — SIGNIFICANT CHANGE UP (ref 92–96)
SAO2 % BLDA: 95 % — SIGNIFICANT CHANGE UP (ref 92–96)
SAO2 % BLDA: 97 % — HIGH (ref 92–96)
SAO2 % BLDA: 98 % — HIGH (ref 92–96)
SAO2 % BLDA: 98 % — HIGH (ref 92–96)
SAO2 % BLDA: 99 % — HIGH (ref 92–96)
SARS-COV-2 RNA SPEC QL NAA+PROBE: DETECTED
SODIUM SERPL-SCNC: 127 MMOL/L — LOW (ref 135–145)
SODIUM SERPL-SCNC: 128 MMOL/L — LOW (ref 135–145)
SODIUM SERPL-SCNC: 129 MMOL/L — LOW (ref 135–145)
SODIUM SERPL-SCNC: 130 MMOL/L — LOW (ref 135–145)
SODIUM SERPL-SCNC: 132 MMOL/L — LOW (ref 135–145)
SODIUM SERPL-SCNC: 133 MMOL/L — LOW (ref 135–145)
SODIUM SERPL-SCNC: 135 MMOL/L — SIGNIFICANT CHANGE UP (ref 135–145)
SODIUM SERPL-SCNC: 136 MMOL/L — SIGNIFICANT CHANGE UP (ref 135–145)
SODIUM SERPL-SCNC: 138 MMOL/L — SIGNIFICANT CHANGE UP (ref 135–145)
SODIUM SERPL-SCNC: 139 MMOL/L — SIGNIFICANT CHANGE UP (ref 135–145)
SODIUM SERPL-SCNC: 139 MMOL/L — SIGNIFICANT CHANGE UP (ref 135–145)
SODIUM SERPL-SCNC: 141 MMOL/L — SIGNIFICANT CHANGE UP (ref 135–145)
SODIUM SERPL-SCNC: 141 MMOL/L — SIGNIFICANT CHANGE UP (ref 135–145)
SP GR SPEC: 1.01 — SIGNIFICANT CHANGE UP (ref 1.01–1.02)
SPECIMEN SOURCE: SIGNIFICANT CHANGE UP
TOTAL CHOLESTEROL/HDL RATIO MEASUREMENT: 3.4 RATIO — SIGNIFICANT CHANGE UP (ref 3.4–9.6)
TOTAL CHOLESTEROL/HDL RATIO MEASUREMENT: 3.6 RATIO — SIGNIFICANT CHANGE UP (ref 3.4–9.6)
TRIGL SERPL-MCNC: 148 MG/DL — SIGNIFICANT CHANGE UP (ref 10–149)
TRIGL SERPL-MCNC: 152 MG/DL — HIGH (ref 10–149)
TROPONIN I SERPL-MCNC: 0.07 NG/ML — HIGH (ref 0.01–0.04)
TROPONIN I SERPL-MCNC: <.015 NG/ML — SIGNIFICANT CHANGE UP (ref 0.01–0.04)
TSH SERPL-MCNC: 0.15 UIU/ML — LOW (ref 0.36–3.74)
UROBILINOGEN FLD QL: NEGATIVE MG/DL — SIGNIFICANT CHANGE UP
WBC # BLD: 10.21 K/UL — SIGNIFICANT CHANGE UP (ref 3.8–10.5)
WBC # BLD: 11.46 K/UL — HIGH (ref 3.8–10.5)
WBC # BLD: 11.85 K/UL — HIGH (ref 3.8–10.5)
WBC # BLD: 11.99 K/UL — HIGH (ref 3.8–10.5)
WBC # BLD: 12.77 K/UL — HIGH (ref 3.8–10.5)
WBC # BLD: 15.09 K/UL — HIGH (ref 3.8–10.5)
WBC # BLD: 17.28 K/UL — HIGH (ref 3.8–10.5)
WBC # BLD: 17.86 K/UL — HIGH (ref 3.8–10.5)
WBC # BLD: 19.76 K/UL — HIGH (ref 3.8–10.5)
WBC # BLD: 20.38 K/UL — HIGH (ref 3.8–10.5)
WBC # BLD: 26.14 K/UL — HIGH (ref 3.8–10.5)
WBC # BLD: 5.37 K/UL — SIGNIFICANT CHANGE UP (ref 3.8–10.5)
WBC # BLD: 7.24 K/UL — SIGNIFICANT CHANGE UP (ref 3.8–10.5)
WBC # BLD: 8.91 K/UL — SIGNIFICANT CHANGE UP (ref 3.8–10.5)
WBC # FLD AUTO: 10.21 K/UL — SIGNIFICANT CHANGE UP (ref 3.8–10.5)
WBC # FLD AUTO: 11.46 K/UL — HIGH (ref 3.8–10.5)
WBC # FLD AUTO: 11.85 K/UL — HIGH (ref 3.8–10.5)
WBC # FLD AUTO: 11.99 K/UL — HIGH (ref 3.8–10.5)
WBC # FLD AUTO: 12.77 K/UL — HIGH (ref 3.8–10.5)
WBC # FLD AUTO: 15.09 K/UL — HIGH (ref 3.8–10.5)
WBC # FLD AUTO: 17.28 K/UL — HIGH (ref 3.8–10.5)
WBC # FLD AUTO: 17.86 K/UL — HIGH (ref 3.8–10.5)
WBC # FLD AUTO: 19.76 K/UL — HIGH (ref 3.8–10.5)
WBC # FLD AUTO: 20.38 K/UL — HIGH (ref 3.8–10.5)
WBC # FLD AUTO: 26.14 K/UL — HIGH (ref 3.8–10.5)
WBC # FLD AUTO: 5.37 K/UL — SIGNIFICANT CHANGE UP (ref 3.8–10.5)
WBC # FLD AUTO: 7.24 K/UL — SIGNIFICANT CHANGE UP (ref 3.8–10.5)
WBC # FLD AUTO: 8.91 K/UL — SIGNIFICANT CHANGE UP (ref 3.8–10.5)
WBC UR QL: SIGNIFICANT CHANGE UP

## 2020-01-01 PROCEDURE — 85025 COMPLETE CBC W/AUTO DIFF WBC: CPT

## 2020-01-01 PROCEDURE — 71045 X-RAY EXAM CHEST 1 VIEW: CPT | Mod: 26

## 2020-01-01 PROCEDURE — 76937 US GUIDE VASCULAR ACCESS: CPT | Mod: 26

## 2020-01-01 PROCEDURE — 99291 CRITICAL CARE FIRST HOUR: CPT

## 2020-01-01 PROCEDURE — 93010 ELECTROCARDIOGRAM REPORT: CPT

## 2020-01-01 PROCEDURE — 36620 INSERTION CATHETER ARTERY: CPT | Mod: CS

## 2020-01-01 PROCEDURE — 93970 EXTREMITY STUDY: CPT

## 2020-01-01 PROCEDURE — 99232 SBSQ HOSP IP/OBS MODERATE 35: CPT

## 2020-01-01 PROCEDURE — 36415 COLL VENOUS BLD VENIPUNCTURE: CPT

## 2020-01-01 PROCEDURE — C9113: CPT

## 2020-01-01 PROCEDURE — 36600 WITHDRAWAL OF ARTERIAL BLOOD: CPT

## 2020-01-01 PROCEDURE — 93005 ELECTROCARDIOGRAM TRACING: CPT

## 2020-01-01 PROCEDURE — 76937 US GUIDE VASCULAR ACCESS: CPT | Mod: 26,CS

## 2020-01-01 PROCEDURE — 71045 X-RAY EXAM CHEST 1 VIEW: CPT

## 2020-01-01 PROCEDURE — 99291 CRITICAL CARE FIRST HOUR: CPT | Mod: CS

## 2020-01-01 PROCEDURE — 94002 VENT MGMT INPAT INIT DAY: CPT

## 2020-01-01 PROCEDURE — 83735 ASSAY OF MAGNESIUM: CPT

## 2020-01-01 PROCEDURE — 84100 ASSAY OF PHOSPHORUS: CPT

## 2020-01-01 PROCEDURE — 36556 INSERT NON-TUNNEL CV CATH: CPT | Mod: LT

## 2020-01-01 PROCEDURE — 93970 EXTREMITY STUDY: CPT | Mod: 26

## 2020-01-01 PROCEDURE — 94003 VENT MGMT INPAT SUBQ DAY: CPT

## 2020-01-01 PROCEDURE — 85027 COMPLETE CBC AUTOMATED: CPT

## 2020-01-01 PROCEDURE — 82803 BLOOD GASES ANY COMBINATION: CPT

## 2020-01-01 PROCEDURE — 82962 GLUCOSE BLOOD TEST: CPT

## 2020-01-01 PROCEDURE — 99233 SBSQ HOSP IP/OBS HIGH 50: CPT

## 2020-01-01 PROCEDURE — 71045 X-RAY EXAM CHEST 1 VIEW: CPT | Mod: 26,CS

## 2020-01-01 PROCEDURE — 99222 1ST HOSP IP/OBS MODERATE 55: CPT

## 2020-01-01 PROCEDURE — 12345: CPT | Mod: NC

## 2020-01-01 PROCEDURE — 99292 CRITICAL CARE ADDL 30 MIN: CPT

## 2020-01-01 PROCEDURE — 80053 COMPREHEN METABOLIC PANEL: CPT

## 2020-01-01 RX ORDER — ATAZANAVIR 200 MG/1
300 CAPSULE ORAL DAILY
Refills: 0 | Status: DISCONTINUED | OUTPATIENT
Start: 2020-01-01 | End: 2020-01-01

## 2020-01-01 RX ORDER — FENTANYL CITRATE 50 UG/ML
1 INJECTION INTRAVENOUS
Qty: 5000 | Refills: 0 | Status: DISCONTINUED | OUTPATIENT
Start: 2020-01-01 | End: 2020-01-01

## 2020-01-01 RX ORDER — INSULIN GLARGINE 100 [IU]/ML
30 INJECTION, SOLUTION SUBCUTANEOUS
Refills: 0 | Status: DISCONTINUED | OUTPATIENT
Start: 2020-01-01 | End: 2020-01-01

## 2020-01-01 RX ORDER — MIDODRINE HYDROCHLORIDE 2.5 MG/1
10 TABLET ORAL EVERY 8 HOURS
Refills: 0 | Status: DISCONTINUED | OUTPATIENT
Start: 2020-01-01 | End: 2020-01-01

## 2020-01-01 RX ORDER — CISATRACURIUM BESYLATE 2 MG/ML
3 INJECTION INTRAVENOUS
Qty: 200 | Refills: 0 | Status: DISCONTINUED | OUTPATIENT
Start: 2020-01-01 | End: 2020-01-01

## 2020-01-01 RX ORDER — DEXTROSE 50 % IN WATER 50 %
12.5 SYRINGE (ML) INTRAVENOUS ONCE
Refills: 0 | Status: COMPLETED | OUTPATIENT
Start: 2020-01-01 | End: 2020-01-01

## 2020-01-01 RX ORDER — ATAZANAVIR 200 MG/1
300 CAPSULE ORAL DAILY
Refills: 0 | Status: COMPLETED | OUTPATIENT
Start: 2020-01-01 | End: 2020-01-01

## 2020-01-01 RX ORDER — FENTANYL CITRATE 50 UG/ML
0.5 INJECTION INTRAVENOUS
Qty: 2500 | Refills: 0 | Status: DISCONTINUED | OUTPATIENT
Start: 2020-01-01 | End: 2020-01-01

## 2020-01-01 RX ORDER — INSULIN LISPRO 100/ML
VIAL (ML) SUBCUTANEOUS EVERY 6 HOURS
Refills: 0 | Status: DISCONTINUED | OUTPATIENT
Start: 2020-01-01 | End: 2020-01-01

## 2020-01-01 RX ORDER — DEXTROSE 50 % IN WATER 50 %
25 SYRINGE (ML) INTRAVENOUS ONCE
Refills: 0 | Status: DISCONTINUED | OUTPATIENT
Start: 2020-01-01 | End: 2020-01-01

## 2020-01-01 RX ORDER — CHLORHEXIDINE GLUCONATE 213 G/1000ML
15 SOLUTION TOPICAL EVERY 12 HOURS
Refills: 0 | Status: DISCONTINUED | OUTPATIENT
Start: 2020-01-01 | End: 2020-01-01

## 2020-01-01 RX ORDER — HYDROXYCHLOROQUINE SULFATE 200 MG
200 TABLET ORAL EVERY 12 HOURS
Refills: 0 | Status: COMPLETED | OUTPATIENT
Start: 2020-01-01 | End: 2020-01-01

## 2020-01-01 RX ORDER — GLUCAGON INJECTION, SOLUTION 0.5 MG/.1ML
1 INJECTION, SOLUTION SUBCUTANEOUS ONCE
Refills: 0 | Status: DISCONTINUED | OUTPATIENT
Start: 2020-01-01 | End: 2020-01-01

## 2020-01-01 RX ORDER — VASOPRESSIN 20 [USP'U]/ML
0.04 INJECTION INTRAVENOUS
Qty: 50 | Refills: 0 | Status: DISCONTINUED | OUTPATIENT
Start: 2020-01-01 | End: 2020-01-01

## 2020-01-01 RX ORDER — BUMETANIDE 0.25 MG/ML
1 INJECTION INTRAMUSCULAR; INTRAVENOUS
Qty: 20 | Refills: 0 | Status: DISCONTINUED | OUTPATIENT
Start: 2020-01-01 | End: 2020-01-01

## 2020-01-01 RX ORDER — LEVOTHYROXINE SODIUM 125 MCG
25 TABLET ORAL AT BEDTIME
Refills: 0 | Status: DISCONTINUED | OUTPATIENT
Start: 2020-01-01 | End: 2020-01-01

## 2020-01-01 RX ORDER — PANTOPRAZOLE SODIUM 20 MG/1
40 TABLET, DELAYED RELEASE ORAL DAILY
Refills: 0 | Status: DISCONTINUED | OUTPATIENT
Start: 2020-01-01 | End: 2020-01-01

## 2020-01-01 RX ORDER — SODIUM CHLORIDE 9 MG/ML
1000 INJECTION, SOLUTION INTRAVENOUS ONCE
Refills: 0 | Status: COMPLETED | OUTPATIENT
Start: 2020-01-01 | End: 2020-01-01

## 2020-01-01 RX ORDER — INSULIN GLARGINE 100 [IU]/ML
25 INJECTION, SOLUTION SUBCUTANEOUS
Refills: 0 | Status: DISCONTINUED | OUTPATIENT
Start: 2020-01-01 | End: 2020-01-01

## 2020-01-01 RX ORDER — MIDAZOLAM HYDROCHLORIDE 1 MG/ML
4 INJECTION, SOLUTION INTRAMUSCULAR; INTRAVENOUS ONCE
Refills: 0 | Status: DISCONTINUED | OUTPATIENT
Start: 2020-01-01 | End: 2020-01-01

## 2020-01-01 RX ORDER — LABETALOL HCL 100 MG
5 TABLET ORAL ONCE
Refills: 0 | Status: COMPLETED | OUTPATIENT
Start: 2020-01-01 | End: 2020-01-01

## 2020-01-01 RX ORDER — SODIUM CHLORIDE 9 MG/ML
1000 INJECTION, SOLUTION INTRAVENOUS
Refills: 0 | Status: DISCONTINUED | OUTPATIENT
Start: 2020-01-01 | End: 2020-01-01

## 2020-01-01 RX ORDER — ACETAMINOPHEN 500 MG
650 TABLET ORAL EVERY 4 HOURS
Refills: 0 | Status: DISCONTINUED | OUTPATIENT
Start: 2020-01-01 | End: 2020-01-01

## 2020-01-01 RX ORDER — INSULIN GLARGINE 100 [IU]/ML
14 INJECTION, SOLUTION SUBCUTANEOUS EVERY MORNING
Refills: 0 | Status: DISCONTINUED | OUTPATIENT
Start: 2020-01-01 | End: 2020-01-01

## 2020-01-01 RX ORDER — DEXTROSE 50 % IN WATER 50 %
15 SYRINGE (ML) INTRAVENOUS ONCE
Refills: 0 | Status: DISCONTINUED | OUTPATIENT
Start: 2020-01-01 | End: 2020-01-01

## 2020-01-01 RX ORDER — ACETAMINOPHEN 500 MG
650 TABLET ORAL EVERY 6 HOURS
Refills: 0 | Status: DISCONTINUED | OUTPATIENT
Start: 2020-01-01 | End: 2020-01-01

## 2020-01-01 RX ORDER — FENTANYL CITRATE 50 UG/ML
3 INJECTION INTRAVENOUS
Qty: 5000 | Refills: 0 | Status: DISCONTINUED | OUTPATIENT
Start: 2020-01-01 | End: 2020-01-01

## 2020-01-01 RX ORDER — AZITHROMYCIN 500 MG/1
500 TABLET, FILM COATED ORAL EVERY 24 HOURS
Refills: 0 | Status: COMPLETED | OUTPATIENT
Start: 2020-01-01 | End: 2020-01-01

## 2020-01-01 RX ORDER — MIDAZOLAM HYDROCHLORIDE 1 MG/ML
0.02 INJECTION, SOLUTION INTRAMUSCULAR; INTRAVENOUS
Qty: 100 | Refills: 0 | Status: DISCONTINUED | OUTPATIENT
Start: 2020-01-01 | End: 2020-01-01

## 2020-01-01 RX ORDER — DEXMEDETOMIDINE HYDROCHLORIDE IN 0.9% SODIUM CHLORIDE 4 UG/ML
0.2 INJECTION INTRAVENOUS
Qty: 400 | Refills: 0 | Status: DISCONTINUED | OUTPATIENT
Start: 2020-01-01 | End: 2020-01-01

## 2020-01-01 RX ORDER — SIMVASTATIN 20 MG/1
40 TABLET, FILM COATED ORAL AT BEDTIME
Refills: 0 | Status: DISCONTINUED | OUTPATIENT
Start: 2020-01-01 | End: 2020-01-01

## 2020-01-01 RX ORDER — DEXTROSE 50 % IN WATER 50 %
12.5 SYRINGE (ML) INTRAVENOUS ONCE
Refills: 0 | Status: DISCONTINUED | OUTPATIENT
Start: 2020-01-01 | End: 2020-01-01

## 2020-01-01 RX ORDER — HEPARIN SODIUM 5000 [USP'U]/ML
5000 INJECTION INTRAVENOUS; SUBCUTANEOUS EVERY 8 HOURS
Refills: 0 | Status: DISCONTINUED | OUTPATIENT
Start: 2020-01-01 | End: 2020-01-01

## 2020-01-01 RX ORDER — NOREPINEPHRINE BITARTRATE/D5W 8 MG/250ML
0.05 PLASTIC BAG, INJECTION (ML) INTRAVENOUS
Qty: 8 | Refills: 0 | Status: DISCONTINUED | OUTPATIENT
Start: 2020-01-01 | End: 2020-01-01

## 2020-01-01 RX ORDER — SODIUM CHLORIDE 9 MG/ML
1000 INJECTION INTRAMUSCULAR; INTRAVENOUS; SUBCUTANEOUS ONCE
Refills: 0 | Status: COMPLETED | OUTPATIENT
Start: 2020-01-01 | End: 2020-01-01

## 2020-01-01 RX ORDER — ACETAMINOPHEN 500 MG
1000 TABLET ORAL ONCE
Refills: 0 | Status: COMPLETED | OUTPATIENT
Start: 2020-01-01 | End: 2020-01-01

## 2020-01-01 RX ORDER — ENOXAPARIN SODIUM 100 MG/ML
30 INJECTION SUBCUTANEOUS
Refills: 0 | Status: DISCONTINUED | OUTPATIENT
Start: 2020-01-01 | End: 2020-01-01

## 2020-01-01 RX ORDER — ROCURONIUM BROMIDE 10 MG/ML
50 VIAL (ML) INTRAVENOUS ONCE
Refills: 0 | Status: COMPLETED | OUTPATIENT
Start: 2020-01-01 | End: 2020-01-01

## 2020-01-01 RX ORDER — INSULIN GLARGINE 100 [IU]/ML
20 INJECTION, SOLUTION SUBCUTANEOUS
Refills: 0 | Status: DISCONTINUED | OUTPATIENT
Start: 2020-01-01 | End: 2020-01-01

## 2020-01-01 RX ORDER — AZITHROMYCIN 500 MG/1
500 TABLET, FILM COATED ORAL DAILY
Refills: 0 | Status: DISCONTINUED | OUTPATIENT
Start: 2020-01-01 | End: 2020-01-01

## 2020-01-01 RX ORDER — CALCIUM GLUCONATE 100 MG/ML
1 VIAL (ML) INTRAVENOUS ONCE
Refills: 0 | Status: COMPLETED | OUTPATIENT
Start: 2020-01-01 | End: 2020-01-01

## 2020-01-01 RX ORDER — INSULIN GLARGINE 100 [IU]/ML
5 INJECTION, SOLUTION SUBCUTANEOUS ONCE
Refills: 0 | Status: COMPLETED | OUTPATIENT
Start: 2020-01-01 | End: 2020-01-01

## 2020-01-01 RX ORDER — FUROSEMIDE 40 MG
40 TABLET ORAL ONCE
Refills: 0 | Status: COMPLETED | OUTPATIENT
Start: 2020-01-01 | End: 2020-01-01

## 2020-01-01 RX ORDER — AZITHROMYCIN 500 MG/1
500 TABLET, FILM COATED ORAL ONCE
Refills: 0 | Status: COMPLETED | OUTPATIENT
Start: 2020-01-01 | End: 2020-01-01

## 2020-01-01 RX ORDER — FENTANYL CITRATE 50 UG/ML
0.5 INJECTION INTRAVENOUS
Qty: 5000 | Refills: 0 | Status: DISCONTINUED | OUTPATIENT
Start: 2020-01-01 | End: 2020-01-01

## 2020-01-01 RX ORDER — ANAKINRA 100MG/0.67
100 SYRINGE (ML) SUBCUTANEOUS EVERY 6 HOURS
Refills: 0 | Status: COMPLETED | OUTPATIENT
Start: 2020-01-01 | End: 2020-01-01

## 2020-01-01 RX ORDER — POLYETHYLENE GLYCOL 3350 17 G/17G
17 POWDER, FOR SOLUTION ORAL DAILY
Refills: 0 | Status: DISCONTINUED | OUTPATIENT
Start: 2020-01-01 | End: 2020-01-01

## 2020-01-01 RX ORDER — HYDROXYCHLOROQUINE SULFATE 200 MG
400 TABLET ORAL EVERY 12 HOURS
Refills: 0 | Status: COMPLETED | OUTPATIENT
Start: 2020-01-01 | End: 2020-01-01

## 2020-01-01 RX ORDER — CHLORHEXIDINE GLUCONATE 213 G/1000ML
1 SOLUTION TOPICAL
Refills: 0 | Status: DISCONTINUED | OUTPATIENT
Start: 2020-01-01 | End: 2020-01-01

## 2020-01-01 RX ORDER — LEVOTHYROXINE SODIUM 125 MCG
25 TABLET ORAL DAILY
Refills: 0 | Status: DISCONTINUED | OUTPATIENT
Start: 2020-01-01 | End: 2020-01-01

## 2020-01-01 RX ORDER — HYDROXYCHLOROQUINE SULFATE 200 MG
TABLET ORAL
Refills: 0 | Status: COMPLETED | OUTPATIENT
Start: 2020-01-01 | End: 2020-01-01

## 2020-01-01 RX ORDER — NOREPINEPHRINE BITARTRATE/D5W 8 MG/250ML
0.05 PLASTIC BAG, INJECTION (ML) INTRAVENOUS
Qty: 16 | Refills: 0 | Status: DISCONTINUED | OUTPATIENT
Start: 2020-01-01 | End: 2020-01-01

## 2020-01-01 RX ORDER — PROPOFOL 10 MG/ML
30 INJECTION, EMULSION INTRAVENOUS
Qty: 1000 | Refills: 0 | Status: DISCONTINUED | OUTPATIENT
Start: 2020-01-01 | End: 2020-01-01

## 2020-01-01 RX ORDER — MONTELUKAST 4 MG/1
10 TABLET, CHEWABLE ORAL DAILY
Refills: 0 | Status: DISCONTINUED | OUTPATIENT
Start: 2020-01-01 | End: 2020-01-01

## 2020-01-01 RX ORDER — INSULIN LISPRO 100/ML
VIAL (ML) SUBCUTANEOUS
Refills: 0 | Status: DISCONTINUED | OUTPATIENT
Start: 2020-01-01 | End: 2020-01-01

## 2020-01-01 RX ORDER — FUROSEMIDE 40 MG
40 TABLET ORAL ONCE
Refills: 0 | Status: DISCONTINUED | OUTPATIENT
Start: 2020-01-01 | End: 2020-01-01

## 2020-01-01 RX ORDER — FUROSEMIDE 40 MG
80 TABLET ORAL ONCE
Refills: 0 | Status: COMPLETED | OUTPATIENT
Start: 2020-01-01 | End: 2020-01-01

## 2020-01-01 RX ORDER — CEFTRIAXONE 500 MG/1
1000 INJECTION, POWDER, FOR SOLUTION INTRAMUSCULAR; INTRAVENOUS ONCE
Refills: 0 | Status: COMPLETED | OUTPATIENT
Start: 2020-01-01 | End: 2020-01-01

## 2020-01-01 RX ORDER — PHENYLEPHRINE HYDROCHLORIDE 10 MG/ML
0.5 INJECTION INTRAVENOUS
Qty: 160 | Refills: 0 | Status: DISCONTINUED | OUTPATIENT
Start: 2020-01-01 | End: 2020-01-01

## 2020-01-01 RX ORDER — INSULIN GLARGINE 100 [IU]/ML
15 INJECTION, SOLUTION SUBCUTANEOUS
Refills: 0 | Status: DISCONTINUED | OUTPATIENT
Start: 2020-01-01 | End: 2020-01-01

## 2020-01-01 RX ORDER — BUMETANIDE 0.25 MG/ML
2 INJECTION INTRAMUSCULAR; INTRAVENOUS
Qty: 20 | Refills: 0 | Status: DISCONTINUED | OUTPATIENT
Start: 2020-01-01 | End: 2020-01-01

## 2020-01-01 RX ORDER — LEVOTHYROXINE SODIUM 125 MCG
50 TABLET ORAL DAILY
Refills: 0 | Status: DISCONTINUED | OUTPATIENT
Start: 2020-01-01 | End: 2020-01-01

## 2020-01-01 RX ORDER — PROPOFOL 10 MG/ML
10 INJECTION, EMULSION INTRAVENOUS
Qty: 1000 | Refills: 0 | Status: DISCONTINUED | OUTPATIENT
Start: 2020-01-01 | End: 2020-01-01

## 2020-01-01 RX ORDER — FUROSEMIDE 40 MG
20 TABLET ORAL DAILY
Refills: 0 | Status: DISCONTINUED | OUTPATIENT
Start: 2020-01-01 | End: 2020-01-01

## 2020-01-01 RX ORDER — ACETAMINOPHEN 500 MG
650 TABLET ORAL ONCE
Refills: 0 | Status: COMPLETED | OUTPATIENT
Start: 2020-01-01 | End: 2020-01-01

## 2020-01-01 RX ADMIN — VASOPRESSIN 2.4 UNIT(S)/MIN: 20 INJECTION INTRAVENOUS at 01:37

## 2020-01-01 RX ADMIN — Medication 80 MILLIGRAM(S): at 11:53

## 2020-01-01 RX ADMIN — MIDODRINE HYDROCHLORIDE 10 MILLIGRAM(S): 2.5 TABLET ORAL at 05:25

## 2020-01-01 RX ADMIN — ENOXAPARIN SODIUM 30 MILLIGRAM(S): 100 INJECTION SUBCUTANEOUS at 06:10

## 2020-01-01 RX ADMIN — PROPOFOL 4.36 MICROGRAM(S)/KG/MIN: 10 INJECTION, EMULSION INTRAVENOUS at 08:05

## 2020-01-01 RX ADMIN — BUMETANIDE 10 MG/HR: 0.25 INJECTION INTRAMUSCULAR; INTRAVENOUS at 01:12

## 2020-01-01 RX ADMIN — Medication 40 MILLIGRAM(S): at 06:05

## 2020-01-01 RX ADMIN — Medication 8: at 05:43

## 2020-01-01 RX ADMIN — CHLORHEXIDINE GLUCONATE 1 APPLICATION(S): 213 SOLUTION TOPICAL at 05:30

## 2020-01-01 RX ADMIN — Medication 4: at 05:43

## 2020-01-01 RX ADMIN — Medication 6.68 MICROGRAM(S)/KG/MIN: at 14:15

## 2020-01-01 RX ADMIN — Medication 200 MILLIGRAM(S): at 12:02

## 2020-01-01 RX ADMIN — SIMVASTATIN 40 MILLIGRAM(S): 20 TABLET, FILM COATED ORAL at 22:50

## 2020-01-01 RX ADMIN — Medication 3.34 MICROGRAM(S)/KG/MIN: at 01:37

## 2020-01-01 RX ADMIN — MIDAZOLAM HYDROCHLORIDE 1.45 MG/KG/HR: 1 INJECTION, SOLUTION INTRAMUSCULAR; INTRAVENOUS at 03:32

## 2020-01-01 RX ADMIN — POLYETHYLENE GLYCOL 3350 17 GRAM(S): 17 POWDER, FOR SOLUTION ORAL at 11:17

## 2020-01-01 RX ADMIN — Medication 650 MILLIGRAM(S): at 22:31

## 2020-01-01 RX ADMIN — INSULIN GLARGINE 14 UNIT(S): 100 INJECTION, SOLUTION SUBCUTANEOUS at 08:01

## 2020-01-01 RX ADMIN — MIDODRINE HYDROCHLORIDE 10 MILLIGRAM(S): 2.5 TABLET ORAL at 22:27

## 2020-01-01 RX ADMIN — MIDODRINE HYDROCHLORIDE 10 MILLIGRAM(S): 2.5 TABLET ORAL at 05:29

## 2020-01-01 RX ADMIN — MIDODRINE HYDROCHLORIDE 10 MILLIGRAM(S): 2.5 TABLET ORAL at 20:10

## 2020-01-01 RX ADMIN — HEPARIN SODIUM 5000 UNIT(S): 5000 INJECTION INTRAVENOUS; SUBCUTANEOUS at 14:31

## 2020-01-01 RX ADMIN — PROPOFOL 12.6 MICROGRAM(S)/KG/MIN: 10 INJECTION, EMULSION INTRAVENOUS at 15:47

## 2020-01-01 RX ADMIN — MIDODRINE HYDROCHLORIDE 10 MILLIGRAM(S): 2.5 TABLET ORAL at 21:06

## 2020-01-01 RX ADMIN — PROPOFOL 4.36 MICROGRAM(S)/KG/MIN: 10 INJECTION, EMULSION INTRAVENOUS at 12:31

## 2020-01-01 RX ADMIN — Medication 650 MILLIGRAM(S): at 15:26

## 2020-01-01 RX ADMIN — PROPOFOL 4.36 MICROGRAM(S)/KG/MIN: 10 INJECTION, EMULSION INTRAVENOUS at 20:10

## 2020-01-01 RX ADMIN — ATAZANAVIR 300 MILLIGRAM(S): 200 CAPSULE ORAL at 15:47

## 2020-01-01 RX ADMIN — Medication 50 MICROGRAM(S): at 05:47

## 2020-01-01 RX ADMIN — ATAZANAVIR 300 MILLIGRAM(S): 200 CAPSULE ORAL at 11:20

## 2020-01-01 RX ADMIN — AZITHROMYCIN 500 MILLIGRAM(S): 500 TABLET, FILM COATED ORAL at 22:31

## 2020-01-01 RX ADMIN — Medication 3.34 MICROGRAM(S)/KG/MIN: at 02:58

## 2020-01-01 RX ADMIN — Medication 100 MILLIGRAM(S): at 05:37

## 2020-01-01 RX ADMIN — ENOXAPARIN SODIUM 30 MILLIGRAM(S): 100 INJECTION SUBCUTANEOUS at 06:14

## 2020-01-01 RX ADMIN — CISATRACURIUM BESYLATE 13.1 MICROGRAM(S)/KG/MIN: 2 INJECTION INTRAVENOUS at 13:01

## 2020-01-01 RX ADMIN — Medication 100 GRAM(S): at 01:31

## 2020-01-01 RX ADMIN — Medication 200 MILLIGRAM(S): at 21:26

## 2020-01-01 RX ADMIN — Medication 650 MILLIGRAM(S): at 00:50

## 2020-01-01 RX ADMIN — MIDODRINE HYDROCHLORIDE 10 MILLIGRAM(S): 2.5 TABLET ORAL at 05:36

## 2020-01-01 RX ADMIN — Medication 8: at 23:34

## 2020-01-01 RX ADMIN — Medication 650 MILLIGRAM(S): at 22:50

## 2020-01-01 RX ADMIN — MIDODRINE HYDROCHLORIDE 10 MILLIGRAM(S): 2.5 TABLET ORAL at 05:44

## 2020-01-01 RX ADMIN — HEPARIN SODIUM 5000 UNIT(S): 5000 INJECTION INTRAVENOUS; SUBCUTANEOUS at 21:09

## 2020-01-01 RX ADMIN — Medication 10: at 17:28

## 2020-01-01 RX ADMIN — Medication 40 MILLIGRAM(S): at 05:38

## 2020-01-01 RX ADMIN — FENTANYL CITRATE 3.63 MICROGRAM(S)/KG/HR: 50 INJECTION INTRAVENOUS at 13:00

## 2020-01-01 RX ADMIN — ENOXAPARIN SODIUM 30 MILLIGRAM(S): 100 INJECTION SUBCUTANEOUS at 17:15

## 2020-01-01 RX ADMIN — CHLORHEXIDINE GLUCONATE 15 MILLILITER(S): 213 SOLUTION TOPICAL at 18:22

## 2020-01-01 RX ADMIN — PROPOFOL 4.36 MICROGRAM(S)/KG/MIN: 10 INJECTION, EMULSION INTRAVENOUS at 17:42

## 2020-01-01 RX ADMIN — Medication 6: at 17:59

## 2020-01-01 RX ADMIN — Medication 100 MILLIGRAM(S): at 23:39

## 2020-01-01 RX ADMIN — CHLORHEXIDINE GLUCONATE 1 APPLICATION(S): 213 SOLUTION TOPICAL at 06:31

## 2020-01-01 RX ADMIN — ENOXAPARIN SODIUM 30 MILLIGRAM(S): 100 INJECTION SUBCUTANEOUS at 05:56

## 2020-01-01 RX ADMIN — FENTANYL CITRATE 3.63 MICROGRAM(S)/KG/HR: 50 INJECTION INTRAVENOUS at 18:21

## 2020-01-01 RX ADMIN — CHLORHEXIDINE GLUCONATE 15 MILLILITER(S): 213 SOLUTION TOPICAL at 17:12

## 2020-01-01 RX ADMIN — Medication 650 MILLIGRAM(S): at 17:13

## 2020-01-01 RX ADMIN — MIDODRINE HYDROCHLORIDE 10 MILLIGRAM(S): 2.5 TABLET ORAL at 04:15

## 2020-01-01 RX ADMIN — HEPARIN SODIUM 5000 UNIT(S): 5000 INJECTION INTRAVENOUS; SUBCUTANEOUS at 12:20

## 2020-01-01 RX ADMIN — MIDODRINE HYDROCHLORIDE 10 MILLIGRAM(S): 2.5 TABLET ORAL at 14:20

## 2020-01-01 RX ADMIN — ENOXAPARIN SODIUM 30 MILLIGRAM(S): 100 INJECTION SUBCUTANEOUS at 17:23

## 2020-01-01 RX ADMIN — CHLORHEXIDINE GLUCONATE 1 APPLICATION(S): 213 SOLUTION TOPICAL at 06:38

## 2020-01-01 RX ADMIN — Medication 25 MICROGRAM(S): at 22:41

## 2020-01-01 RX ADMIN — PROPOFOL 4.36 MICROGRAM(S)/KG/MIN: 10 INJECTION, EMULSION INTRAVENOUS at 01:07

## 2020-01-01 RX ADMIN — Medication 200 MILLIGRAM(S): at 22:55

## 2020-01-01 RX ADMIN — Medication 100 MILLIGRAM(S): at 12:14

## 2020-01-01 RX ADMIN — CHLORHEXIDINE GLUCONATE 15 MILLILITER(S): 213 SOLUTION TOPICAL at 05:44

## 2020-01-01 RX ADMIN — CHLORHEXIDINE GLUCONATE 15 MILLILITER(S): 213 SOLUTION TOPICAL at 17:42

## 2020-01-01 RX ADMIN — CISATRACURIUM BESYLATE 13.1 MICROGRAM(S)/KG/MIN: 2 INJECTION INTRAVENOUS at 05:51

## 2020-01-01 RX ADMIN — Medication 25 MICROGRAM(S): at 22:37

## 2020-01-01 RX ADMIN — Medication 25 MICROGRAM(S): at 05:25

## 2020-01-01 RX ADMIN — Medication 200 MILLIGRAM(S): at 22:40

## 2020-01-01 RX ADMIN — MIDODRINE HYDROCHLORIDE 10 MILLIGRAM(S): 2.5 TABLET ORAL at 22:04

## 2020-01-01 RX ADMIN — ENOXAPARIN SODIUM 30 MILLIGRAM(S): 100 INJECTION SUBCUTANEOUS at 06:04

## 2020-01-01 RX ADMIN — SIMVASTATIN 40 MILLIGRAM(S): 20 TABLET, FILM COATED ORAL at 21:10

## 2020-01-01 RX ADMIN — MIDODRINE HYDROCHLORIDE 10 MILLIGRAM(S): 2.5 TABLET ORAL at 05:43

## 2020-01-01 RX ADMIN — Medication 6: at 05:37

## 2020-01-01 RX ADMIN — ENOXAPARIN SODIUM 30 MILLIGRAM(S): 100 INJECTION SUBCUTANEOUS at 16:13

## 2020-01-01 RX ADMIN — ENOXAPARIN SODIUM 30 MILLIGRAM(S): 100 INJECTION SUBCUTANEOUS at 17:06

## 2020-01-01 RX ADMIN — MIDAZOLAM HYDROCHLORIDE 1.45 MG/KG/HR: 1 INJECTION, SOLUTION INTRAMUSCULAR; INTRAVENOUS at 18:05

## 2020-01-01 RX ADMIN — Medication 40 MILLIGRAM(S): at 12:58

## 2020-01-01 RX ADMIN — Medication 6: at 11:54

## 2020-01-01 RX ADMIN — Medication 200 MILLIGRAM(S): at 11:20

## 2020-01-01 RX ADMIN — MONTELUKAST 10 MILLIGRAM(S): 4 TABLET, CHEWABLE ORAL at 12:55

## 2020-01-01 RX ADMIN — MIDODRINE HYDROCHLORIDE 10 MILLIGRAM(S): 2.5 TABLET ORAL at 14:23

## 2020-01-01 RX ADMIN — Medication 400 MILLIGRAM(S): at 12:27

## 2020-01-01 RX ADMIN — Medication 650 MILLIGRAM(S): at 03:39

## 2020-01-01 RX ADMIN — PANTOPRAZOLE SODIUM 40 MILLIGRAM(S): 20 TABLET, DELAYED RELEASE ORAL at 12:25

## 2020-01-01 RX ADMIN — Medication 20 MILLIGRAM(S): at 05:49

## 2020-01-01 RX ADMIN — Medication 40 MILLIGRAM(S): at 15:47

## 2020-01-01 RX ADMIN — CHLORHEXIDINE GLUCONATE 15 MILLILITER(S): 213 SOLUTION TOPICAL at 04:14

## 2020-01-01 RX ADMIN — Medication 8: at 23:29

## 2020-01-01 RX ADMIN — INSULIN GLARGINE 20 UNIT(S): 100 INJECTION, SOLUTION SUBCUTANEOUS at 21:25

## 2020-01-01 RX ADMIN — FENTANYL CITRATE 3.63 MICROGRAM(S)/KG/HR: 50 INJECTION INTRAVENOUS at 20:10

## 2020-01-01 RX ADMIN — SIMVASTATIN 40 MILLIGRAM(S): 20 TABLET, FILM COATED ORAL at 22:09

## 2020-01-01 RX ADMIN — ATAZANAVIR 300 MILLIGRAM(S): 200 CAPSULE ORAL at 13:55

## 2020-01-01 RX ADMIN — Medication 40 MILLIGRAM(S): at 05:46

## 2020-01-01 RX ADMIN — PROPOFOL 4.36 MICROGRAM(S)/KG/MIN: 10 INJECTION, EMULSION INTRAVENOUS at 13:00

## 2020-01-01 RX ADMIN — Medication 650 MILLIGRAM(S): at 20:10

## 2020-01-01 RX ADMIN — Medication 6.81 MICROGRAM(S)/KG/MIN: at 03:44

## 2020-01-01 RX ADMIN — ENOXAPARIN SODIUM 30 MILLIGRAM(S): 100 INJECTION SUBCUTANEOUS at 05:29

## 2020-01-01 RX ADMIN — Medication 6: at 05:50

## 2020-01-01 RX ADMIN — CHLORHEXIDINE GLUCONATE 15 MILLILITER(S): 213 SOLUTION TOPICAL at 05:37

## 2020-01-01 RX ADMIN — CHLORHEXIDINE GLUCONATE 1 APPLICATION(S): 213 SOLUTION TOPICAL at 05:44

## 2020-01-01 RX ADMIN — Medication 40 MILLIGRAM(S): at 17:59

## 2020-01-01 RX ADMIN — SODIUM CHLORIDE 1000 MILLILITER(S): 9 INJECTION INTRAMUSCULAR; INTRAVENOUS; SUBCUTANEOUS at 22:31

## 2020-01-01 RX ADMIN — Medication 2: at 17:10

## 2020-01-01 RX ADMIN — INSULIN GLARGINE 20 UNIT(S): 100 INJECTION, SOLUTION SUBCUTANEOUS at 22:23

## 2020-01-01 RX ADMIN — ATAZANAVIR 300 MILLIGRAM(S): 200 CAPSULE ORAL at 12:02

## 2020-01-01 RX ADMIN — CISATRACURIUM BESYLATE 13.1 MICROGRAM(S)/KG/MIN: 2 INJECTION INTRAVENOUS at 17:42

## 2020-01-01 RX ADMIN — MIDODRINE HYDROCHLORIDE 10 MILLIGRAM(S): 2.5 TABLET ORAL at 12:22

## 2020-01-01 RX ADMIN — POLYETHYLENE GLYCOL 3350 17 GRAM(S): 17 POWDER, FOR SOLUTION ORAL at 12:22

## 2020-01-01 RX ADMIN — MIDODRINE HYDROCHLORIDE 10 MILLIGRAM(S): 2.5 TABLET ORAL at 07:04

## 2020-01-01 RX ADMIN — Medication 4: at 17:20

## 2020-01-01 RX ADMIN — Medication 50 MICROGRAM(S): at 06:05

## 2020-01-01 RX ADMIN — CHLORHEXIDINE GLUCONATE 1 APPLICATION(S): 213 SOLUTION TOPICAL at 08:15

## 2020-01-01 RX ADMIN — PROPOFOL 12.6 MICROGRAM(S)/KG/MIN: 10 INJECTION, EMULSION INTRAVENOUS at 01:12

## 2020-01-01 RX ADMIN — CISATRACURIUM BESYLATE 13.1 MICROGRAM(S)/KG/MIN: 2 INJECTION INTRAVENOUS at 05:36

## 2020-01-01 RX ADMIN — MIDODRINE HYDROCHLORIDE 10 MILLIGRAM(S): 2.5 TABLET ORAL at 21:44

## 2020-01-01 RX ADMIN — Medication 5 MILLIGRAM(S): at 02:56

## 2020-01-01 RX ADMIN — Medication 40 MILLIGRAM(S): at 05:52

## 2020-01-01 RX ADMIN — BUMETANIDE 10 MG/HR: 0.25 INJECTION INTRAMUSCULAR; INTRAVENOUS at 21:06

## 2020-01-01 RX ADMIN — SIMVASTATIN 40 MILLIGRAM(S): 20 TABLET, FILM COATED ORAL at 21:26

## 2020-01-01 RX ADMIN — ENOXAPARIN SODIUM 30 MILLIGRAM(S): 100 INJECTION SUBCUTANEOUS at 05:52

## 2020-01-01 RX ADMIN — PANTOPRAZOLE SODIUM 40 MILLIGRAM(S): 20 TABLET, DELAYED RELEASE ORAL at 11:17

## 2020-01-01 RX ADMIN — BUMETANIDE 10 MG/HR: 0.25 INJECTION INTRAMUSCULAR; INTRAVENOUS at 16:27

## 2020-01-01 RX ADMIN — Medication 6: at 17:42

## 2020-01-01 RX ADMIN — Medication 50 MICROGRAM(S): at 06:15

## 2020-01-01 RX ADMIN — Medication 12.5 GRAM(S): at 18:20

## 2020-01-01 RX ADMIN — MIDAZOLAM HYDROCHLORIDE 1.45 MG/KG/HR: 1 INJECTION, SOLUTION INTRAMUSCULAR; INTRAVENOUS at 08:29

## 2020-01-01 RX ADMIN — PHENYLEPHRINE HYDROCHLORIDE 6.68 MICROGRAM(S)/KG/MIN: 10 INJECTION INTRAVENOUS at 14:00

## 2020-01-01 RX ADMIN — FENTANYL CITRATE 1.78 MICROGRAM(S)/KG/HR: 50 INJECTION INTRAVENOUS at 09:00

## 2020-01-01 RX ADMIN — Medication 6.68 MICROGRAM(S)/KG/MIN: at 17:30

## 2020-01-01 RX ADMIN — MIDODRINE HYDROCHLORIDE 10 MILLIGRAM(S): 2.5 TABLET ORAL at 22:50

## 2020-01-01 RX ADMIN — INSULIN GLARGINE 20 UNIT(S): 100 INJECTION, SOLUTION SUBCUTANEOUS at 07:05

## 2020-01-01 RX ADMIN — CHLORHEXIDINE GLUCONATE 15 MILLILITER(S): 213 SOLUTION TOPICAL at 18:03

## 2020-01-01 RX ADMIN — ENOXAPARIN SODIUM 30 MILLIGRAM(S): 100 INJECTION SUBCUTANEOUS at 17:11

## 2020-01-01 RX ADMIN — Medication 25 MICROGRAM(S): at 22:04

## 2020-01-01 RX ADMIN — Medication 40 MILLIGRAM(S): at 17:42

## 2020-01-01 RX ADMIN — Medication 6.68 MICROGRAM(S)/KG/MIN: at 23:43

## 2020-01-01 RX ADMIN — MIDODRINE HYDROCHLORIDE 10 MILLIGRAM(S): 2.5 TABLET ORAL at 05:38

## 2020-01-01 RX ADMIN — PROPOFOL 4.36 MICROGRAM(S)/KG/MIN: 10 INJECTION, EMULSION INTRAVENOUS at 18:03

## 2020-01-01 RX ADMIN — Medication 25 MICROGRAM(S): at 07:05

## 2020-01-01 RX ADMIN — ENOXAPARIN SODIUM 30 MILLIGRAM(S): 100 INJECTION SUBCUTANEOUS at 17:20

## 2020-01-01 RX ADMIN — Medication 40 MILLIGRAM(S): at 16:13

## 2020-01-01 RX ADMIN — SIMVASTATIN 40 MILLIGRAM(S): 20 TABLET, FILM COATED ORAL at 22:40

## 2020-01-01 RX ADMIN — PROPOFOL 4.36 MICROGRAM(S)/KG/MIN: 10 INJECTION, EMULSION INTRAVENOUS at 22:20

## 2020-01-01 RX ADMIN — MIDAZOLAM HYDROCHLORIDE 1.45 MG/KG/HR: 1 INJECTION, SOLUTION INTRAMUSCULAR; INTRAVENOUS at 00:58

## 2020-01-01 RX ADMIN — ENOXAPARIN SODIUM 30 MILLIGRAM(S): 100 INJECTION SUBCUTANEOUS at 17:42

## 2020-01-01 RX ADMIN — POLYETHYLENE GLYCOL 3350 17 GRAM(S): 17 POWDER, FOR SOLUTION ORAL at 14:31

## 2020-01-01 RX ADMIN — CHLORHEXIDINE GLUCONATE 15 MILLILITER(S): 213 SOLUTION TOPICAL at 17:28

## 2020-01-01 RX ADMIN — Medication 4: at 23:53

## 2020-01-01 RX ADMIN — SIMVASTATIN 40 MILLIGRAM(S): 20 TABLET, FILM COATED ORAL at 22:21

## 2020-01-01 RX ADMIN — CHLORHEXIDINE GLUCONATE 15 MILLILITER(S): 213 SOLUTION TOPICAL at 05:43

## 2020-01-01 RX ADMIN — CHLORHEXIDINE GLUCONATE 1 APPLICATION(S): 213 SOLUTION TOPICAL at 04:00

## 2020-01-01 RX ADMIN — Medication 50 MICROGRAM(S): at 05:16

## 2020-01-01 RX ADMIN — CHLORHEXIDINE GLUCONATE 1 APPLICATION(S): 213 SOLUTION TOPICAL at 07:49

## 2020-01-01 RX ADMIN — BUMETANIDE 5 MG/HR: 0.25 INJECTION INTRAMUSCULAR; INTRAVENOUS at 01:59

## 2020-01-01 RX ADMIN — MIDODRINE HYDROCHLORIDE 10 MILLIGRAM(S): 2.5 TABLET ORAL at 13:08

## 2020-01-01 RX ADMIN — Medication 50 MILLIGRAM(S): at 09:40

## 2020-01-01 RX ADMIN — MIDODRINE HYDROCHLORIDE 10 MILLIGRAM(S): 2.5 TABLET ORAL at 13:16

## 2020-01-01 RX ADMIN — Medication 200 MILLIGRAM(S): at 13:55

## 2020-01-01 RX ADMIN — Medication 50 MICROGRAM(S): at 05:52

## 2020-01-01 RX ADMIN — Medication 6.81 MICROGRAM(S)/KG/MIN: at 13:01

## 2020-01-01 RX ADMIN — DEXMEDETOMIDINE HYDROCHLORIDE IN 0.9% SODIUM CHLORIDE 3.57 MICROGRAM(S)/KG/HR: 4 INJECTION INTRAVENOUS at 00:08

## 2020-01-01 RX ADMIN — Medication 40 MILLIGRAM(S): at 17:20

## 2020-01-01 RX ADMIN — Medication 400 MILLIGRAM(S): at 09:08

## 2020-01-01 RX ADMIN — Medication 100 MILLIGRAM(S): at 11:53

## 2020-01-01 RX ADMIN — Medication 40 MILLIGRAM(S): at 05:57

## 2020-01-01 RX ADMIN — FENTANYL CITRATE 7 MICROGRAM(S)/KG/HR: 50 INJECTION INTRAVENOUS at 00:30

## 2020-01-01 RX ADMIN — INSULIN GLARGINE 20 UNIT(S): 100 INJECTION, SOLUTION SUBCUTANEOUS at 22:39

## 2020-01-01 RX ADMIN — Medication 100 MILLIGRAM(S): at 04:14

## 2020-01-01 RX ADMIN — Medication 50 MICROGRAM(S): at 05:56

## 2020-01-01 RX ADMIN — MIDODRINE HYDROCHLORIDE 10 MILLIGRAM(S): 2.5 TABLET ORAL at 22:10

## 2020-01-01 RX ADMIN — Medication 8: at 05:31

## 2020-01-01 RX ADMIN — INSULIN GLARGINE 20 UNIT(S): 100 INJECTION, SOLUTION SUBCUTANEOUS at 08:18

## 2020-01-01 RX ADMIN — BUMETANIDE 10 MG/HR: 0.25 INJECTION INTRAMUSCULAR; INTRAVENOUS at 00:27

## 2020-01-01 RX ADMIN — MIDODRINE HYDROCHLORIDE 10 MILLIGRAM(S): 2.5 TABLET ORAL at 13:42

## 2020-01-01 RX ADMIN — SIMVASTATIN 40 MILLIGRAM(S): 20 TABLET, FILM COATED ORAL at 22:19

## 2020-01-01 RX ADMIN — AZITHROMYCIN 500 MILLIGRAM(S): 500 TABLET, FILM COATED ORAL at 12:28

## 2020-01-01 RX ADMIN — CHLORHEXIDINE GLUCONATE 15 MILLILITER(S): 213 SOLUTION TOPICAL at 05:26

## 2020-01-01 RX ADMIN — DEXMEDETOMIDINE HYDROCHLORIDE IN 0.9% SODIUM CHLORIDE 3.57 MICROGRAM(S)/KG/HR: 4 INJECTION INTRAVENOUS at 01:30

## 2020-01-01 RX ADMIN — Medication 650 MILLIGRAM(S): at 14:00

## 2020-01-01 RX ADMIN — INSULIN GLARGINE 14 UNIT(S): 100 INJECTION, SOLUTION SUBCUTANEOUS at 11:53

## 2020-01-01 RX ADMIN — PROPOFOL 12.6 MICROGRAM(S)/KG/MIN: 10 INJECTION, EMULSION INTRAVENOUS at 12:11

## 2020-01-01 RX ADMIN — PROPOFOL 4.36 MICROGRAM(S)/KG/MIN: 10 INJECTION, EMULSION INTRAVENOUS at 00:57

## 2020-01-01 RX ADMIN — Medication 25 MICROGRAM(S): at 05:43

## 2020-01-01 RX ADMIN — Medication 200 MILLIGRAM(S): at 22:09

## 2020-01-01 RX ADMIN — CHLORHEXIDINE GLUCONATE 15 MILLILITER(S): 213 SOLUTION TOPICAL at 18:41

## 2020-01-01 RX ADMIN — Medication 40 MILLIGRAM(S): at 17:12

## 2020-01-01 RX ADMIN — ENOXAPARIN SODIUM 30 MILLIGRAM(S): 100 INJECTION SUBCUTANEOUS at 04:15

## 2020-01-01 RX ADMIN — Medication 25 MICROGRAM(S): at 21:26

## 2020-01-01 RX ADMIN — PROPOFOL 12.6 MICROGRAM(S)/KG/MIN: 10 INJECTION, EMULSION INTRAVENOUS at 21:11

## 2020-01-01 RX ADMIN — ENOXAPARIN SODIUM 30 MILLIGRAM(S): 100 INJECTION SUBCUTANEOUS at 05:46

## 2020-01-01 RX ADMIN — SIMVASTATIN 40 MILLIGRAM(S): 20 TABLET, FILM COATED ORAL at 22:27

## 2020-01-01 RX ADMIN — ENOXAPARIN SODIUM 30 MILLIGRAM(S): 100 INJECTION SUBCUTANEOUS at 18:00

## 2020-01-01 RX ADMIN — CHLORHEXIDINE GLUCONATE 15 MILLILITER(S): 213 SOLUTION TOPICAL at 17:13

## 2020-01-01 RX ADMIN — Medication 50 MICROGRAM(S): at 06:10

## 2020-01-01 RX ADMIN — SIMVASTATIN 40 MILLIGRAM(S): 20 TABLET, FILM COATED ORAL at 21:44

## 2020-01-01 RX ADMIN — Medication 40 MILLIGRAM(S): at 06:15

## 2020-01-01 RX ADMIN — Medication 6: at 12:08

## 2020-01-01 RX ADMIN — CEFTRIAXONE 100 MILLIGRAM(S): 500 INJECTION, POWDER, FOR SOLUTION INTRAMUSCULAR; INTRAVENOUS at 01:31

## 2020-01-01 RX ADMIN — Medication 100 MILLIGRAM(S): at 18:06

## 2020-01-01 RX ADMIN — INSULIN GLARGINE 15 UNIT(S): 100 INJECTION, SOLUTION SUBCUTANEOUS at 21:05

## 2020-01-01 RX ADMIN — ENOXAPARIN SODIUM 30 MILLIGRAM(S): 100 INJECTION SUBCUTANEOUS at 16:36

## 2020-01-01 RX ADMIN — Medication 20 MILLIGRAM(S): at 17:06

## 2020-01-01 RX ADMIN — CHLORHEXIDINE GLUCONATE 15 MILLILITER(S): 213 SOLUTION TOPICAL at 06:31

## 2020-01-01 RX ADMIN — Medication 2: at 11:46

## 2020-01-01 RX ADMIN — BUMETANIDE 10 MG/HR: 0.25 INJECTION INTRAMUSCULAR; INTRAVENOUS at 18:18

## 2020-01-01 RX ADMIN — PHENYLEPHRINE HYDROCHLORIDE 6.68 MICROGRAM(S)/KG/MIN: 10 INJECTION INTRAVENOUS at 16:50

## 2020-01-01 RX ADMIN — SODIUM CHLORIDE 1000 MILLILITER(S): 9 INJECTION, SOLUTION INTRAVENOUS at 16:50

## 2020-01-01 RX ADMIN — Medication 8: at 22:23

## 2020-01-01 RX ADMIN — BUMETANIDE 10 MG/HR: 0.25 INJECTION INTRAMUSCULAR; INTRAVENOUS at 02:59

## 2020-01-01 RX ADMIN — PROPOFOL 12.6 MICROGRAM(S)/KG/MIN: 10 INJECTION, EMULSION INTRAVENOUS at 18:40

## 2020-01-01 RX ADMIN — SODIUM CHLORIDE 1000 MILLILITER(S): 9 INJECTION INTRAMUSCULAR; INTRAVENOUS; SUBCUTANEOUS at 23:22

## 2020-01-01 RX ADMIN — Medication 6.81 MICROGRAM(S)/KG/MIN: at 17:56

## 2020-01-01 RX ADMIN — MIDAZOLAM HYDROCHLORIDE 4 MILLIGRAM(S): 1 INJECTION, SOLUTION INTRAMUSCULAR; INTRAVENOUS at 11:30

## 2020-01-01 RX ADMIN — CHLORHEXIDINE GLUCONATE 15 MILLILITER(S): 213 SOLUTION TOPICAL at 18:47

## 2020-01-01 RX ADMIN — MIDODRINE HYDROCHLORIDE 10 MILLIGRAM(S): 2.5 TABLET ORAL at 15:34

## 2020-01-01 RX ADMIN — Medication 40 MILLIGRAM(S): at 17:16

## 2020-01-01 RX ADMIN — ATAZANAVIR 300 MILLIGRAM(S): 200 CAPSULE ORAL at 12:51

## 2020-01-01 RX ADMIN — ENOXAPARIN SODIUM 30 MILLIGRAM(S): 100 INJECTION SUBCUTANEOUS at 17:27

## 2020-01-01 RX ADMIN — BUMETANIDE 10 MG/HR: 0.25 INJECTION INTRAMUSCULAR; INTRAVENOUS at 12:13

## 2020-01-01 RX ADMIN — DEXMEDETOMIDINE HYDROCHLORIDE IN 0.9% SODIUM CHLORIDE 3.57 MICROGRAM(S)/KG/HR: 4 INJECTION INTRAVENOUS at 06:09

## 2020-01-01 RX ADMIN — Medication 3.34 MICROGRAM(S)/KG/MIN: at 04:23

## 2020-01-01 RX ADMIN — VASOPRESSIN 2.4 UNIT(S)/MIN: 20 INJECTION INTRAVENOUS at 14:30

## 2020-01-01 RX ADMIN — ENOXAPARIN SODIUM 30 MILLIGRAM(S): 100 INJECTION SUBCUTANEOUS at 17:12

## 2020-01-01 RX ADMIN — PROPOFOL 4.36 MICROGRAM(S)/KG/MIN: 10 INJECTION, EMULSION INTRAVENOUS at 17:53

## 2020-01-01 RX ADMIN — HEPARIN SODIUM 5000 UNIT(S): 5000 INJECTION INTRAVENOUS; SUBCUTANEOUS at 07:05

## 2020-01-01 RX ADMIN — MIDAZOLAM HYDROCHLORIDE 1.45 MG/KG/HR: 1 INJECTION, SOLUTION INTRAMUSCULAR; INTRAVENOUS at 12:11

## 2020-01-01 RX ADMIN — Medication 2 MILLIGRAM(S): at 16:50

## 2020-01-01 RX ADMIN — Medication 200 MILLIGRAM(S): at 12:51

## 2020-01-01 RX ADMIN — SIMVASTATIN 40 MILLIGRAM(S): 20 TABLET, FILM COATED ORAL at 21:06

## 2020-01-01 RX ADMIN — HEPARIN SODIUM 5000 UNIT(S): 5000 INJECTION INTRAVENOUS; SUBCUTANEOUS at 05:43

## 2020-01-01 RX ADMIN — Medication 100 MILLIGRAM(S): at 23:30

## 2020-01-01 RX ADMIN — MIDODRINE HYDROCHLORIDE 10 MILLIGRAM(S): 2.5 TABLET ORAL at 12:25

## 2020-01-01 RX ADMIN — BUMETANIDE 10 MG/HR: 0.25 INJECTION INTRAMUSCULAR; INTRAVENOUS at 08:57

## 2020-01-01 RX ADMIN — CHLORHEXIDINE GLUCONATE 1 APPLICATION(S): 213 SOLUTION TOPICAL at 12:58

## 2020-01-01 RX ADMIN — FENTANYL CITRATE 3.63 MICROGRAM(S)/KG/HR: 50 INJECTION INTRAVENOUS at 05:23

## 2020-01-01 RX ADMIN — ENOXAPARIN SODIUM 30 MILLIGRAM(S): 100 INJECTION SUBCUTANEOUS at 05:37

## 2020-01-01 RX ADMIN — INSULIN GLARGINE 20 UNIT(S): 100 INJECTION, SOLUTION SUBCUTANEOUS at 08:36

## 2020-01-01 RX ADMIN — Medication 100 MILLIGRAM(S): at 17:13

## 2020-01-01 RX ADMIN — PROPOFOL 4.36 MICROGRAM(S)/KG/MIN: 10 INJECTION, EMULSION INTRAVENOUS at 05:51

## 2020-01-01 RX ADMIN — Medication 25 MICROGRAM(S): at 21:05

## 2020-01-01 RX ADMIN — PROPOFOL 4.36 MICROGRAM(S)/KG/MIN: 10 INJECTION, EMULSION INTRAVENOUS at 21:08

## 2020-01-01 RX ADMIN — ENOXAPARIN SODIUM 30 MILLIGRAM(S): 100 INJECTION SUBCUTANEOUS at 17:41

## 2020-01-01 RX ADMIN — Medication 40 MILLIGRAM(S): at 04:15

## 2020-01-01 RX ADMIN — FENTANYL CITRATE 7 MICROGRAM(S)/KG/HR: 50 INJECTION INTRAVENOUS at 08:46

## 2020-01-01 RX ADMIN — MIDODRINE HYDROCHLORIDE 10 MILLIGRAM(S): 2.5 TABLET ORAL at 13:13

## 2020-01-01 RX ADMIN — Medication 100 MILLIGRAM(S): at 23:33

## 2020-01-01 RX ADMIN — PROPOFOL 4.36 MICROGRAM(S)/KG/MIN: 10 INJECTION, EMULSION INTRAVENOUS at 00:27

## 2020-01-01 RX ADMIN — INSULIN GLARGINE 20 UNIT(S): 100 INJECTION, SOLUTION SUBCUTANEOUS at 08:56

## 2020-01-01 RX ADMIN — FENTANYL CITRATE 3.63 MICROGRAM(S)/KG/HR: 50 INJECTION INTRAVENOUS at 08:24

## 2020-01-01 RX ADMIN — MIDODRINE HYDROCHLORIDE 10 MILLIGRAM(S): 2.5 TABLET ORAL at 21:26

## 2020-01-01 RX ADMIN — Medication 100 MILLIGRAM(S): at 05:43

## 2020-01-01 RX ADMIN — Medication 40 MILLIGRAM(S): at 06:11

## 2020-01-01 RX ADMIN — CHLORHEXIDINE GLUCONATE 15 MILLILITER(S): 213 SOLUTION TOPICAL at 17:41

## 2020-01-01 RX ADMIN — Medication 650 MILLIGRAM(S): at 05:43

## 2020-01-01 RX ADMIN — Medication 40 MILLIGRAM(S): at 05:43

## 2020-01-01 RX ADMIN — Medication 6: at 17:44

## 2020-01-01 RX ADMIN — SIMVASTATIN 40 MILLIGRAM(S): 20 TABLET, FILM COATED ORAL at 20:27

## 2020-01-01 RX ADMIN — Medication 6: at 23:38

## 2020-01-01 RX ADMIN — MONTELUKAST 10 MILLIGRAM(S): 4 TABLET, CHEWABLE ORAL at 17:06

## 2020-01-01 RX ADMIN — Medication 650 MILLIGRAM(S): at 21:10

## 2020-01-01 RX ADMIN — HEPARIN SODIUM 5000 UNIT(S): 5000 INJECTION INTRAVENOUS; SUBCUTANEOUS at 22:49

## 2020-01-01 RX ADMIN — Medication 100 MILLIGRAM(S): at 12:56

## 2020-01-01 RX ADMIN — Medication 6: at 17:06

## 2020-01-01 RX ADMIN — Medication 0: at 17:40

## 2020-01-01 RX ADMIN — Medication 6: at 12:24

## 2020-01-01 RX ADMIN — HEPARIN SODIUM 5000 UNIT(S): 5000 INJECTION INTRAVENOUS; SUBCUTANEOUS at 05:25

## 2020-01-01 RX ADMIN — HEPARIN SODIUM 5000 UNIT(S): 5000 INJECTION INTRAVENOUS; SUBCUTANEOUS at 20:09

## 2020-01-01 RX ADMIN — Medication 10: at 12:15

## 2020-01-01 RX ADMIN — PROPOFOL 4.36 MICROGRAM(S)/KG/MIN: 10 INJECTION, EMULSION INTRAVENOUS at 09:03

## 2020-01-01 RX ADMIN — INSULIN GLARGINE 20 UNIT(S): 100 INJECTION, SOLUTION SUBCUTANEOUS at 21:00

## 2020-01-01 RX ADMIN — Medication 40 MILLIGRAM(S): at 05:29

## 2020-01-01 RX ADMIN — PROPOFOL 12.6 MICROGRAM(S)/KG/MIN: 10 INJECTION, EMULSION INTRAVENOUS at 05:30

## 2020-01-01 RX ADMIN — ENOXAPARIN SODIUM 30 MILLIGRAM(S): 100 INJECTION SUBCUTANEOUS at 05:36

## 2020-01-01 RX ADMIN — BUMETANIDE 10 MG/HR: 0.25 INJECTION INTRAMUSCULAR; INTRAVENOUS at 12:31

## 2020-01-01 RX ADMIN — MIDAZOLAM HYDROCHLORIDE 1.45 MG/KG/HR: 1 INJECTION, SOLUTION INTRAMUSCULAR; INTRAVENOUS at 00:24

## 2020-01-01 RX ADMIN — AZITHROMYCIN 500 MILLIGRAM(S): 500 TABLET, FILM COATED ORAL at 13:55

## 2020-01-01 RX ADMIN — CHLORHEXIDINE GLUCONATE 15 MILLILITER(S): 213 SOLUTION TOPICAL at 06:38

## 2020-01-01 RX ADMIN — SIMVASTATIN 40 MILLIGRAM(S): 20 TABLET, FILM COATED ORAL at 22:04

## 2020-01-01 RX ADMIN — BUMETANIDE 10 MG/HR: 0.25 INJECTION INTRAMUSCULAR; INTRAVENOUS at 06:15

## 2020-01-01 RX ADMIN — CISATRACURIUM BESYLATE 13.1 MICROGRAM(S)/KG/MIN: 2 INJECTION INTRAVENOUS at 13:03

## 2020-01-01 RX ADMIN — Medication 2: at 22:59

## 2020-01-01 RX ADMIN — Medication 400 MILLIGRAM(S): at 22:21

## 2020-01-01 RX ADMIN — Medication 40 MILLIGRAM(S): at 17:23

## 2020-01-01 RX ADMIN — Medication 8: at 23:20

## 2020-01-01 RX ADMIN — Medication 6: at 12:21

## 2020-01-01 RX ADMIN — Medication 6: at 05:22

## 2020-01-01 RX ADMIN — ENOXAPARIN SODIUM 30 MILLIGRAM(S): 100 INJECTION SUBCUTANEOUS at 05:16

## 2020-01-01 RX ADMIN — Medication 40 MILLIGRAM(S): at 17:27

## 2020-01-01 RX ADMIN — Medication 40 MILLIGRAM(S): at 16:36

## 2020-01-01 RX ADMIN — PANTOPRAZOLE SODIUM 40 MILLIGRAM(S): 20 TABLET, DELAYED RELEASE ORAL at 12:22

## 2020-01-01 RX ADMIN — Medication 650 MILLIGRAM(S): at 15:30

## 2020-01-01 RX ADMIN — Medication 40 MILLIGRAM(S): at 17:08

## 2020-01-01 RX ADMIN — MIDODRINE HYDROCHLORIDE 10 MILLIGRAM(S): 2.5 TABLET ORAL at 21:10

## 2020-01-01 RX ADMIN — Medication 4: at 12:50

## 2020-01-01 RX ADMIN — Medication 100 MILLIGRAM(S): at 17:29

## 2020-01-01 RX ADMIN — HEPARIN SODIUM 5000 UNIT(S): 5000 INJECTION INTRAVENOUS; SUBCUTANEOUS at 13:30

## 2020-01-01 RX ADMIN — SIMVASTATIN 40 MILLIGRAM(S): 20 TABLET, FILM COATED ORAL at 20:10

## 2020-01-01 RX ADMIN — INSULIN GLARGINE 5 UNIT(S): 100 INJECTION, SOLUTION SUBCUTANEOUS at 23:20

## 2020-01-01 RX ADMIN — Medication 6.68 MICROGRAM(S)/KG/MIN: at 21:02

## 2020-01-01 RX ADMIN — SODIUM CHLORIDE 1000 MILLILITER(S): 9 INJECTION, SOLUTION INTRAVENOUS at 10:15

## 2020-04-04 NOTE — ED PROVIDER NOTE - PHYSICAL EXAMINATION
Vitals: tachy at 113, htn at 142/86, borderline temp of 100, 97% with non-rebreather   Gen: AAOx3, NAD, sitting uncomfortably in stretcher, non-toxic, speaking in short sentences   Head: ncat, perrla, eomi b/l  Neck: supple, no lymphadenopathy, no midline deviation  Heart: rrr, no m/r/g  Lungs: CTA b/l, no rales/ronchi/wheezes  Abd: soft, nontender, non-distended, no rebound or guarding  Ext: no clubbing/cyanosis/edema  Neuro: sensation and muscle strength intact b/l, steady gait

## 2020-04-04 NOTE — ED PROVIDER NOTE - CARE PLAN
Principal Discharge DX:	Sepsis, due to unspecified organism, unspecified whether acute organ dysfunction present Principal Discharge DX:	Sepsis, due to unspecified organism, unspecified whether acute organ dysfunction present  Secondary Diagnosis:	Pneumonia of both lungs due to infectious organism, unspecified part of lung

## 2020-04-04 NOTE — ED PROVIDER NOTE - CLINICAL SUMMARY MEDICAL DECISION MAKING FREE TEXT BOX
74 yo M with likely sepsis, possible covid pna, doubt acs  -sepsis w/u, covid swab, dimer, ldh, cpk, crp, abg, lactate, procal, hiv, bnp, ua, cx, xray chest, ekg, iv, monitor, ns hydration, tylenol for fever, antbx  -f/u results, reeval, admit

## 2020-04-04 NOTE — ED PROVIDER NOTE - OBJECTIVE STATEMENT
74 yo M bibems for sob and chest tightness with fever.  Pt. sick for 4 days with fever, chills, body aches, cough, worsened over the last day, with particularly increased sob.  Oxygen notably low at triage~80% on RA.  No other complaints or inciting event.   ROS: negative for headache, chest pain, abd pain, nausea, vomiting, diarrhea, rash, paresthesia, and focal weakness--all other systems reviewed are negative.   PMH: negative; Meds: Denies; SH: Denies smoking/drinking/drug use 74 yo M bibems for sob and chest tightness with fever.  Pt. sick for 4 days with fever, chills, body aches, cough, worsened over the last day, with particularly increased sob.  Oxygen notably low at triage~80% on RA.  No other complaints or inciting event.   ROS: negative for headache, chest pain, abd pain, nausea, vomiting, diarrhea, rash, paresthesia, and focal weakness--all other systems reviewed are negative.   PMH: hypothyroid, HLD; Meds: See EMR; SH: Denies smoking/drinking/drug use

## 2020-04-05 NOTE — H&P ADULT - NSHPLABSRESULTS_GEN_ALL_CORE
Recent Vitals  T(C): 36.9 (04-04-20 @ 23:36), Max: 38.8 (04-04-20 @ 22:11)  HR: 84 (04-04-20 @ 23:36) (84 - 113)  BP: 139/77 (04-04-20 @ 23:36) (139/77 - 142/86)  RR: 48 (04-04-20 @ 23:36) (26 - 48)  SpO2: 93% (04-04-20 @ 23:36) (93% - 97%)                        13.2   5.37  )-----------( 132      ( 04 Apr 2020 22:35 )             39.1     04-04    128<L>  |  96  |  15  ----------------------------<  139<H>  4.1   |  22  |  1.01    Ca    7.8<L>      04 Apr 2020 22:35    TPro  7.6  /  Alb  3.2<L>  /  TBili  0.4  /  DBili  x   /  AST  32  /  ALT  23  /  AlkPhos  44  04-04    PT/INR - ( 04 Apr 2020 22:35 )   PT: 11.8 sec;   INR: 1.05 ratio         PTT - ( 04 Apr 2020 22:35 )  PTT:30.0 sec  LIVER FUNCTIONS - ( 04 Apr 2020 22:35 )  Alb: 3.2 g/dL / Pro: 7.6 gm/dL / ALK PHOS: 44 U/L / ALT: 23 U/L / AST: 32 U/L / GGT: x               Home Medications:

## 2020-04-05 NOTE — ED ADULT NURSE REASSESSMENT NOTE - NS ED NURSE REASSESS COMMENT FT1
pat desatting and placed prone with relief. Reoriented and repositioned to Prone on multiple occasions with relief.

## 2020-04-05 NOTE — H&P ADULT - NSHPPHYSICALEXAM_GEN_ALL_CORE
Physical exam:  General: patient in no acute distress, resting comfortably  Head:  Atraumatic, Normocephalic  Eyes: EOMI, PERRLA, clear sclera  Neck: Supple, thyroid nontender, non enlarged  Cardio: S1/S2 +ve, regular rate and rhythm, no M/G/R  Resp: bilateral rales  GI: abdomen soft, nontender, non distended, no guarding, BS +ve x 4  Ext: no significant pedal edema  Neuro: CN 2-12 intact, no significant motor or sensory deficits.  Skin: No rashes or lesions

## 2020-04-05 NOTE — H&P ADULT - PROBLEM SELECTOR PLAN 1
COVID 19 swabbed in ED.  Flu/RSV negative.  RVP pending.  Isolation precautions  EKG to check QTc, Follow official CXR, Blood Cx   Tylenol PRN fever  Start Plaquenil, Solumedrol

## 2020-04-05 NOTE — CHART NOTE - NSCHARTNOTEFT_GEN_A_CORE
76YO M with a PMH of hypothryoidism and HLD who presents to the ED for fever and dyspena.  Patient admitted for evaluation for COVID19.  Has had symptoms for 7 days.  Symptoms include fever, chills, cough, generalized weakness and   increased dyspnea.  Cough has been nonproductive.    Patient seen and examined.   Reviewed H and P, labs and imaging.   Continue with Hydroxychloroquine, Zithromax, follow up COVID and blood cultures.

## 2020-04-05 NOTE — H&P ADULT - NSHPREVIEWOFSYSTEMS_GEN_ALL_CORE
Constitutional: fever, chills +ve   Ears: no hearing changes or ear pain,   Nose: no nasal congestion, sinus pain, or rhinorrhea  Cardio: no chest pain, orthopnea, edema, or palpitations  Resp: dyspnea, cough +ve   GI: no nausea, vomiting, diarrhea, constipation, hematochezia, or melena  : no dysuria, urinary frequency, hematuria  MSK: no back pain, neck pain  Skin: no rash, pruritis   Neuro: weakness +ve No dizziness, lightheadedness, syncope   Heme/Lymph: no bruising or bleeding

## 2020-04-05 NOTE — H&P ADULT - ASSESSMENT
Patient is a 74YO M with a PMH of hypothryoidism and HLD who presents to the ED for fever and dyspena.  Patient admitted for evaluation for COVID19.  Has had symptoms for 7 days.  Symptoms include fever, chills, cough, generalized weakness and   increased dyspnea.  Cough has been nonproductive.  Denies hx of nausea, vomiting, diarrhea.  Denies prior testing for COVID19 and   denies recent exposure to anyone with known COVID19.  Nonsmoker, NKDA.  Currently 91% on NRB.  Febrile to 101.8. in ED.  Labs benign.  CXR shows bilateral opacities.  Will admit to floor.        IMPROVE VTE Individual Risk Assessment          RISK                                                          Points  [  ] Previous VTE                                                3  [  ] Thrombophilia                                             2  [  ] Lower limb paralysis                                    2        (unable to hold up >15 seconds)    [  ] Current Cancer                                             2         (within 6 months)  [  ] Immobilization > 24 hrs                              1  [  ] ICU/CCU stay > 24 hours                            1  [  ] Age > 60                                                    1    IMPROVE VTE Score - 1

## 2020-04-05 NOTE — ED ADULT NURSE REASSESSMENT NOTE - NS ED NURSE REASSESS COMMENT FT1
Report given to the hold nurse Camron, report includes IV access, pts current status as well as a detailed medical history, pt understands need for admission and had questions answered, reviewed patient concerns with the holding RN, VS recorded and placed in the EMR. Education for patient deemed successful with teach back demonstration.

## 2020-04-05 NOTE — H&P ADULT - HISTORY OF PRESENT ILLNESS
Patient is a 76YO M with a PMH of hypothryoidism and HLD who presents to the ED for fever and dyspena.  Patient admitted for evaluation for COVID19.  Has had symptoms for 7 days.  Symptoms include fever, chills, cough, generalized weakness and   increased dyspnea.  Cough has been nonproductive.  Denies hx of nausea, vomiting, diarrhea.  Denies prior testing for COVID19 and   denies recent exposure to anyone with known COVID19.  Nonsmoker, NKDA.  Currently 91% on NRB.  Febrile to 101.8. in ED.  Labs benign.  CXR shows bilateral opacities.  Will admit to floor.

## 2020-04-06 NOTE — PROGRESS NOTE ADULT - ASSESSMENT
Patient is a 76YO M with a PMH of hypothryoidism and HLD who presents to the ED for fever and dyspena.  Patient admitted for evaluation for COVID19.  Has had symptoms for 7 days.  Symptoms include fever, chills, cough, generalized weakness and   increased dyspnea.  Cough has been nonproductive.  Denies hx of nausea, vomiting, diarrhea.  Denies prior testing for COVID19 and   denies recent exposure to anyone with known COVID19.  Nonsmoker, NKDA.  Currently 91% on NRB.  Febrile to 101.8. in ED.  Labs benign.  CXR shows bilateral opacities.  Will admit to floor.        IMPROVE VTE Individual Risk Assessment          RISK                                                          Points  [  ] Previous VTE                                                3  [  ] Thrombophilia                                             2  [  ] Lower limb paralysis                                    2        (unable to hold up >15 seconds)    [  ] Current Cancer                                             2         (within 6 months)  [  ] Immobilization > 24 hrs                              1  [  ] ICU/CCU stay > 24 hours                            1  [  ] Age > 60                                                    1    IMPROVE VTE Score - 1

## 2020-04-06 NOTE — PROGRESS NOTE ADULT - SUBJECTIVE AND OBJECTIVE BOX
Patient is a 75y old  Male who presents with a chief complaint of dyspnea (05 Apr 2020 01:40)      SUBJECTIVE / OVERNIGHT EVENTS: No events over night.     MEDICATIONS  (STANDING):  enoxaparin Injectable 30 milliGRAM(s) SubCutaneous two times a day  hydroxychloroquine 200 milliGRAM(s) Oral every 12 hours  hydroxychloroquine   Oral   levothyroxine 50 MICROGram(s) Oral daily  methylPREDNISolone sodium succinate Injectable 40 milliGRAM(s) IV Push every 12 hours  simvastatin 40 milliGRAM(s) Oral at bedtime    MEDICATIONS  (PRN):  acetaminophen   Tablet .. 650 milliGRAM(s) Oral every 4 hours PRN Temp greater or equal to 38.5C (101.3F)  CAPILLARY BLOOD GLUCOSE    CAPILLARY BLOOD GLUCOSE      POCT Blood Glucose.: 237 mg/dL (06 Apr 2020 08:16)    PHYSICAL EXAM:  GENERAL: NAD, well-developed  HEAD:  Atraumatic, Normocephalic  EYES: EOMI, conjunctiva and sclera clear  NECK: Supple, No JVD  CHEST/LUNG: Clear to auscultation bilaterally; No wheeze  HEART: Regular rate and rhythm; No murmurs, rubs, or gallops  ABDOMEN: Soft, Nontender, Nondistended; Bowel sounds present  EXTREMITIES:  2+ Peripheral Pulses, No clubbing, cyanosis, or edema  PSYCH: AAOx3  NEUROLOGY: non-focal  SKIN: No rashes or lesions                        13.9   7.24  )-----------( 167      ( 06 Apr 2020 07:00 )             41.2       CARDIAC MARKERS ( 04 Apr 2020 22:35 )  <.015 ng/mL / x     / 192 U/L / x     / x          LIVER FUNCTIONS - ( 04 Apr 2020 22:35 )  Alb: 3.2 g/dL / Pro: 7.6 gm/dL / ALK PHOS: 44 U/L / ALT: 23 U/L / AST: 32 U/L / GGT: x           PT/INR - ( 04 Apr 2020 22:35 )   PT: 11.8 sec;   INR: 1.05 ratio         PTT - ( 04 Apr 2020 22:35 )  PTT:30.0 sec      CAPILLARY BLOOD GLUCOSE      POCT Blood Glucose.: 237 mg/dL (06 Apr 2020 08:16)      RADIOLOGY & ADDITIONAL TESTS:    Imaging Personally Reviewed:    Consultant(s) Notes Reviewed:      Care Discussed with Consultants/Other Providers:

## 2020-04-06 NOTE — PHYSICAL THERAPY INITIAL EVALUATION ADULT - CRITERIA FOR SKILLED THERAPEUTIC INTERVENTIONS
impairments found/functional limitations in following categories/Subacute Rehab/therapy frequency/predicted duration of therapy intervention/anticipated discharge recommendation/risk reduction/prevention/rehab potential

## 2020-04-06 NOTE — PHYSICAL THERAPY INITIAL EVALUATION ADULT - DID THE PATIENT HAVE SURGERY?
n/a/Pt was admitted to [facility] 2o sepsis due to unspecific organism, Covid 19.

## 2020-04-06 NOTE — CHART NOTE - NSCHARTNOTEFT_GEN_A_CORE
RRT Note  =========================    CC: RRT called for pt with hypoxia O2 saturation 86-87% on NRB   Patient was hypoxic however appeared comfortable and O2 saturation was improving to 91% after he stopped moving   HPI: Patient is a 75y old Male admitted for SEPSIS DUE TO UNSPECIFIED ORGANISM; PNEUMONIA OF BOTH LUNGS  DIFFICULTY BREATHING    Pt seen and examined at bedside.    REVIEW OF SYSTEMS:  ==================  Respiratory: Denies cough, wheezing, chills, hemoptysis, shortness of breath, difficulty breathing  Cardiovascular: Denies chest pain, palpitations, dizziness, leg swelling  Gastrointestinal: Denies abdominal pain, nausea, vomiting, hematemesis, diarrhea, constipation, melena, hematochezia  Genitourinary: Denies dysuria, frequency, hematuria, retention, incontinence  Neurological: Denies headaches, memory loss, loss of strength, numbness, tremors    VITALS:  ========  T(C): 36.7 (04-06-20 @ 06:23), Max: 38.3 (04-05-20 @ 14:17)  HR: 76 (04-06-20 @ 06:23) (71 - 104)  BP: 119/69 (04-06-20 @ 06:23) (119/69 - 136/79)  RR: 18 (04-06-20 @ 06:23) (17 - 24)  SpO2: 90% (04-06-20 @ 06:23) (90% - 96%)  Wt(kg): --    PHYSICAL EXAM:  =================  General: NAD, well-groomed, well-developed.  Eyes: PERRLA, EOMI. Conjunctiva and sclera clear.  Lungs: Airway patent. CTA B/L. Normal breath sounds. No wheezes, rales, rhonchi.  Heart: RRR. Normal S1/S2. No murmurs appreciated.  Abdomen: Soft, NT/ND. Normoactive BS x 4 quadrants.  Neurological:  AAOx3. Good concentration. Strength 5/5 B/L upper and lower extremities. No pronator drift.  Extremities:  2+ B/L peripheral pulses. No clubbing, cyanosis, or edema.    LABS:  ======  CAPILLARY BLOOD GLUCOSE      POCT Blood Glucose.: 237 mg/dL (06 Apr 2020 08:16)                          13.9   7.24  )-----------( 167      ( 06 Apr 2020 07:00 )             41.2     04-05    129<L>  |  95<L>  |  14  ----------------------------<  182<H>  4.4   |  24  |  1.00    Ca    8.0<L>      05 Apr 2020 18:20    TPro  7.6  /  Alb  3.2<L>  /  TBili  0.4  /  DBili  x   /  AST  32  /  ALT  23  /  AlkPhos  44  04-04    CARDIAC MARKERS ( 04 Apr 2020 22:35 )  <.015 ng/mL / x     / 192 U/L / x     / x          PT/INR - ( 04 Apr 2020 22:35 )   PT: 11.8 sec;   INR: 1.05 ratio         PTT - ( 04 Apr 2020 22:35 )  PTT:30.0 sec  LIVER FUNCTIONS - ( 04 Apr 2020 22:35 )  Alb: 3.2 g/dL / Pro: 7.6 gm/dL / ALK PHOS: 44 U/L / ALT: 23 U/L / AST: 32 U/L / GGT: x                                     ASSESSMENT:  =============  Patient is a 75y with SEPSIS DUE TO UNSPECIFIED ORGANISM; PNEUMONIA OF BOTH LUNGS  DIFFICULTY BREATHING  Thyroid disease    Plan  =======  - Patient to remain where he is   -continue to monitor saturation   -continue Covid protocol   - Discussed with Dr Engle   No significant past surgical history      PLAN:  =======  -   - Continue current treatment  - Follow up labs  - D/w                       aware and agree with the plan  - Will continue to follow up

## 2020-04-06 NOTE — PROGRESS NOTE ADULT - PROBLEM SELECTOR PLAN 1
COVID 19 Detected.  Flu/RSV negative. Isolation precautions  EKG to check QTc, Follow official CXR, Blood Cx   Tylenol PRN fever  Start Plaquenil, Solumedrol, [place].

## 2020-04-06 NOTE — PHYSICAL THERAPY INITIAL EVALUATION ADULT - ADDITIONAL COMMENTS
As per patient he lives in a  private home with wife and daughter, house has 3 steps to enter with no HR's, inside the house everything else is on one level. he has 13 steps with R HR to access second floor but he stays on the first floor. he was independent during functional mobility.

## 2020-04-07 NOTE — PROGRESS NOTE ADULT - PROBLEM SELECTOR PLAN 1
COVID 19 Detected.  Flu/RSV negative. Isolation precautions  EKG to check QTc, Follow official CXR, Blood Cx   Tylenol PRN fever  Continue Plaquenil, Solumedrol, Rey[place]z.   ?Volume over load, follow up BNP, Lasix x 1 dose given.

## 2020-04-07 NOTE — PROGRESS NOTE ADULT - SUBJECTIVE AND OBJECTIVE BOX
Patient is a 75y old  Male who presents with a chief complaint of dyspnea (05 Apr 2020 01:40)      SUBJECTIVE / OVERNIGHT EVENTS: No events over night.     T(C): 36.1 (04-07-20 @ 12:04), Max: 36.1 (04-07-20 @ 05:56)  HR: 86 (04-07-20 @ 12:04) (86 - 88)  BP: 159/80 (04-07-20 @ 12:04) (134/76 - 159/80)  RR: 18 (04-07-20 @ 12:04) (18 - 20)  SpO2: 91% (04-07-20 @ 12:04) (91% - 91%)    MEDICATIONS  (STANDING):  atazanavir 300 milliGRAM(s) Oral daily  enoxaparin Injectable 30 milliGRAM(s) SubCutaneous two times a day  hydroxychloroquine 200 milliGRAM(s) Oral every 12 hours  hydroxychloroquine   Oral   levothyroxine 50 MICROGram(s) Oral daily  methylPREDNISolone sodium succinate Injectable 40 milliGRAM(s) IV Push every 12 hours  simvastatin 40 milliGRAM(s) Oral at bedtime    MEDICATIONS  (PRN):  acetaminophen   Tablet .. 650 milliGRAM(s) Oral every 4 hours PRN Temp greater or equal to 38.5C (101.3F)      CAPILLARY BLOOD GLUCOSE        PHYSICAL EXAM:  GENERAL: NAD, well-developed  HEAD:  Atraumatic, Normocephalic  EYES: EOMI, conjunctiva and sclera clear  NECK: Supple, No JVD  CHEST/LUNG: Clear to auscultation bilaterally; No wheeze  HEART: Regular rate and rhythm; No murmurs, rubs, or gallops  ABDOMEN: Soft, Nontender, Nondistended; Bowel sounds present  EXTREMITIES:  2+ Peripheral Pulses, No clubbing, cyanosis, or edema  PSYCH: AAOx3  NEUROLOGY: non-focal  SKIN: No rashes or lesions                                       13.9   7.24  )-----------( 167      ( 06 Apr 2020 07:00 )             41.2                 RADIOLOGY & ADDITIONAL TESTS:    Imaging Personally Reviewed:    Consultant(s) Notes Reviewed:      Care Discussed with Consultants/Other Providers:

## 2020-04-08 NOTE — PROGRESS NOTE ADULT - SUBJECTIVE AND OBJECTIVE BOX
Patient is a 75y old  Male who presents with a chief complaint of dyspnea (07 Apr 2020 10:51)    HPI:  Patient is a 76YO M with a PMH of hypothryoidism and HLD who presents to the ED for fever and dyspena.  Patient admitted for evaluation for COVID19.  Has had symptoms for 7 days.  Symptoms include fever, chills, cough, generalized weakness and   increased dyspnea.  Cough has been nonproductive.  Denies hx of nausea, vomiting, diarrhea.  Denies prior testing for COVID19 and   denies recent exposure to anyone with known COVID19.  Nonsmoker, NKDA.  Currently 91% on NRB.  Febrile to 101.8. in ED.  Labs benign.  CXR shows bilateral opacities.  Will admit to floor. (05 Apr 2020 01:40)    SUBJECTIVE & OBJECTIVE: Pt seen and examined at bedside.   PHYSICAL EXAM:  ICU Vital Signs Last 24 Hrs  T(C): 36.8 (08 Apr 2020 05:09), Max: 36.9 (07 Apr 2020 17:25)  T(F): 98.3 (08 Apr 2020 05:09), Max: 98.4 (07 Apr 2020 17:25)  HR: 87 (08 Apr 2020 09:15) (85 - 90)  BP: 137/77 (08 Apr 2020 09:15) (132/72 - 159/80)  BP(mean): --  ABP: --  ABP(mean): --  RR: 18 (08 Apr 2020 05:09) (18 - 20)  SpO2: 90% (08 Apr 2020 09:15) (88% - 91%)  Daily     Daily   I&O's Detail    07 Apr 2020 07:01  -  08 Apr 2020 07:00  --------------------------------------------------------  IN:    Oral Fluid: 320 mL  Total IN: 320 mL    OUT:    Voided: 400 mL  Total OUT: 400 mL    Total NET: -80 mL        GENERAL: NAD, well-groomed, well-developed  HEAD:  Atraumatic, Normocephalic  EYES: EOMI, PERRLA, conjunctiva and sclera clear  ENMT: Moist mucous membranes  NECK: Supple, No JVD  NERVOUS SYSTEM:  Alert & Oriented X3, Motor Strength 5/5 B/L upper and lower extremities; DTRs 2+ intact and symmetric  CHEST/LUNG: Clear to auscultation bilaterally; No rales, rhonchi, wheezing, or rubs  HEART: Regular rate and rhythm; No murmurs, rubs, or gallops  ABDOMEN: Soft, Nontender, Nondistended; Bowel sounds present  EXTREMITIES:  2+ Peripheral Pulses, No clubbing, cyanosis, or edema  MEDICATIONS  (STANDING):  atazanavir 300 milliGRAM(s) Oral daily  enoxaparin Injectable 30 milliGRAM(s) SubCutaneous two times a day  hydroxychloroquine 200 milliGRAM(s) Oral every 12 hours  hydroxychloroquine   Oral   levothyroxine 50 MICROGram(s) Oral daily  methylPREDNISolone sodium succinate Injectable 40 milliGRAM(s) IV Push two times a day  simvastatin 40 milliGRAM(s) Oral at bedtime    MEDICATIONS  (PRN):  acetaminophen   Tablet .. 650 milliGRAM(s) Oral every 4 hours PRN Temp greater or equal to 38.5C (101.3F)    LABS:                        13.8   11.99 )-----------( 223      ( 08 Apr 2020 09:24 )             40.8     04-08    135  |  98  |  23  ----------------------------<  260<H>  4.6   |  26  |  1.13    Ca    8.2<L>      08 Apr 2020 09:28  Phos  2.7     04-08  Mg     2.9     04-08          CAPILLARY BLOOD GLUCOSE                RECENT CULTURES:    RADIOLOGY & ADDITIONAL TESTS:    DVT/GI ppx  Discussed with pt @ bedside Patient is a 75y old  Male who presents with a chief complaint of dyspnea (07 Apr 2020 10:51)    HPI:  Patient is a 74YO M with a PMH of hypothryoidism and HLD who presents to the ED for fever and dyspena.  Patient admitted for evaluation for COVID19.  Has had symptoms for 7 days.  Symptoms include fever, chills, cough, generalized weakness and increased dyspnea.  Cough has been nonproductive.  Denies hx of nausea, vomiting, diarrhea.  Denies prior testing for COVID19 and denies recent exposure to anyone with known COVID19.  Nonsmoker, NKDA.  Currently 91% on NRB.  Febrile to 101.8. in ED.  Labs benign.  CXR shows bilateral opacities.  Will admit to floor. (05 Apr 2020 01:40)    SUBJECTIVE & OBJECTIVE: Pt seen and examined at bedside. He is febrile this am and on NRB.  Placed in prone position   PHYSICAL EXAM:  Vital Signs Last 24 Hrs  T(C): 36.8 (08 Apr 2020 05:09), Max: 36.9 (07 Apr 2020 17:25)  T(F): 98.3 (08 Apr 2020 05:09), Max: 98.4 (07 Apr 2020 17:25)  HR: 87 (08 Apr 2020 09:15) (85 - 90)  BP: 137/77 (08 Apr 2020 09:15) (132/72 - 159/80)  RR: 18 (08 Apr 2020 05:09) (18 - 20)  SpO2: 90% (08 Apr 2020 09:15) (88% - 91%)  Daily     Daily   I&O's Detail    07 Apr 2020 07:01  -  08 Apr 2020 07:00  --------------------------------------------------------  IN:    Oral Fluid: 320 mL  Total IN: 320 mL    OUT:    Voided: 400 mL  Total OUT: 400 mL    Total NET: -80 mL        GENERAL: flushed, acutely ill appearing.   HEAD:  Atraumatic, Normocephalic  EYES: EOMI, PERRLA, conjunctiva and sclera clear  ENMT: Moist mucous membranes, on NRB  NECK: Supple, No JVD  NERVOUS SYSTEM:  Alert & Oriented X3, Motor Strength 5/5 B/L upper and lower extremities; DTRs 2+ intact and symmetric  CHEST/LUNG: Clear to auscultation bilaterally; No rales, rhonchi, wheezing, or rubs  HEART: Regular rate and rhythm; No murmurs, rubs, or gallops  ABDOMEN: Soft, Nontender, Nondistended; Bowel sounds present  EXTREMITIES:  2+ Peripheral Pulses, No clubbing, cyanosis, or edema  MEDICATIONS  (STANDING):  atazanavir 300 milliGRAM(s) Oral daily  enoxaparin Injectable 30 milliGRAM(s) SubCutaneous two times a day  hydroxychloroquine 200 milliGRAM(s) Oral every 12 hours  hydroxychloroquine   Oral   levothyroxine 50 MICROGram(s) Oral daily  methylPREDNISolone sodium succinate Injectable 40 milliGRAM(s) IV Push two times a day  simvastatin 40 milliGRAM(s) Oral at bedtime    MEDICATIONS  (PRN):  acetaminophen   Tablet .. 650 milliGRAM(s) Oral every 4 hours PRN Temp greater or equal to 38.5C (101.3F)    LABS:                        13.8   11.99 )-----------( 223      ( 08 Apr 2020 09:24 )             40.8     04-08    135  |  98  |  23  ----------------------------<  260<H>  4.6   |  26  |  1.13    Ca    8.2<L>      08 Apr 2020 09:28  Phos  2.7     04-08  Mg     2.9     04-08          CAPILLARY BLOOD GLUCOSE                RECENT CULTURES:    RADIOLOGY & ADDITIONAL TESTS:    DVT/GI ppx  Discussed with pt @ bedside

## 2020-04-08 NOTE — PROGRESS NOTE ADULT - ASSESSMENT
Patient is a 74YO M with a PMH of hypothryoidism and HLD who presents to the ED for fever and dyspena.    Problem/Plan - 1:  ·  Problem: Pneumonia due to infectious organism, unspecified laterality, unspecified part of lung.  Plan: COVID 19 Detected.  Flu/RSV negative. Isolation precautions  EKG to check QTc, Follow official CXR, Blood Cx   Tylenol PRN fever  Continue Plaquenil, Solumedrol, Reyataz.   ?Volume over load, follow up BNP, Lasix x 1 dose given.     Problem/Plan - 2:  ·  Problem: Hyponatremia.  Plan: Likely hypovolemic, ?SIADH, follow up serum osmolality.   Monitor BMP.     Problem/Plan - 3:  ·  Problem: Hypothyroidism, unspecified type.  Plan: synthroid.     Problem/Plan - 4:  ·  Problem: Dyslipidemia.  Plan: simvastatin. Patient is a 74YO M with a PMH of hypothryoidism and HLD who presents to the ED for fever and dyspena.    Problem/Plan - 1:  ·  Problem: Pneumonia due to infectious organism, unspecified laterality, unspecified part of lung.    Plan: COVID 19 Detected.  Flu/RSV negative. Isolation precautions  - on Reyataz and Plaquenil  - on Solumedrol  - on Lovenox 30 mg SC Q12 hours  - trending d-dimer, ldh, crp, ferritin   - DAILY PRONE POSITIONING     Problem/Plan - 2:  ·  Problem: Hyponatremia.    Plan: trending daily   - fluid restriction     Problem/Plan - 3:  ·  Problem: Hypothyroidism, unspecified type.  Plan: synthroid.     Problem/Plan - 4:  ·  Problem: Dyslipidemia.  Plan: simvastatin.

## 2020-04-08 NOTE — PROGRESS NOTE ADULT - NSHPATTENDINGPLANDISCUSS_GEN_ALL_CORE
patient at bedside. patient at bedside. Wife by phone.  Asked that daughter Laura  be placed as emergency contact [phone]

## 2020-04-09 NOTE — DIETITIAN INITIAL EVALUATION ADULT. - PROBLEM/PLAN-5
Message received from lab regarding lab appointment on 3-11-19---no open lab orders currently. Patient then has his CPE on 3-15-19.    Last labs were done 2-27-18: Hep C antibody screen (NEG) and PSA (1.49).  Prior to this, last labs were 9-24-13: glucose (80), lipid panel (all WNL).    On no meds.    Problem list: dyslipidemia, varicose veins of legs    Please advise on which labs PCP would like drawn.   DISPLAY PLAN FREE TEXT

## 2020-04-09 NOTE — DIETITIAN INITIAL EVALUATION ADULT. - OTHER INFO
Pt w/ pneumonia due to infection organism COVID 19 detected. Pt is on solumedrol. Isolation : Airborne/contact - COVID -19. 4/18 - gluc - 260. No prior hx DM noted.  Unable to perform Physical Assessment at this time. Pt w/ pneumonia due to infection organism COVID 19 detected. Pt is on solumedrol. Isolation : Airborne/contact - COVID -19. 4/18 - gluc - 260. No prior hx DM noted.  Unable to obtain usual diet / weight hx -> attempted to call pt via extension [phone]. Unable to perform Physical Assessment at this time.

## 2020-04-09 NOTE — DIETITIAN INITIAL EVALUATION ADULT. - PERTINENT MEDS FT
MEDICATIONS  (STANDING):  enoxaparin Injectable 30 milliGRAM(s) SubCutaneous two times a day  hydroxychloroquine 200 milliGRAM(s) Oral every 12 hours  hydroxychloroquine   Oral   levothyroxine 50 MICROGram(s) Oral daily  methylPREDNISolone sodium succinate Injectable 40 milliGRAM(s) IV Push two times a day  simvastatin 40 milliGRAM(s) Oral at bedtime    MEDICATIONS  (PRN):  acetaminophen   Tablet .. 650 milliGRAM(s) Oral every 4 hours PRN Temp greater or equal to 38.5C (101.3F)

## 2020-04-09 NOTE — PROGRESS NOTE ADULT - NSHPATTENDINGPLANDISCUSS_GEN_ALL_CORE
patient at bedside. Wife by phone.  Asked that daughter Laura  be placed as emergency contact [phone]

## 2020-04-09 NOTE — PROGRESS NOTE ADULT - SUBJECTIVE AND OBJECTIVE BOX
Patient is a 75y old  Male who presents with a chief complaint of dyspnea (07 Apr 2020 10:51)    HPI:  Patient is a 76YO M with a PMH of hypothryoidism and HLD who presents to the ED for fever and dyspena.  Patient admitted for evaluation for COVID19.  Has had symptoms for 7 days.  Symptoms include fever, chills, cough, generalized weakness and increased dyspnea.  Cough has been nonproductive.  Denies hx of nausea, vomiting, diarrhea.  Denies prior testing for COVID19 and denies recent exposure to anyone with known COVID19.  Nonsmoker, NKDA.  Currently 91% on NRB.  Febrile to 101.8. in ED.  Labs benign.  CXR shows bilateral opacities.  Will admit to floor. (05 Apr 2020 01:40)    SUBJECTIVE & OBJECTIVE: Pt seen and examined at bedside. He is febrile this am and on NRB.  Placed in prone position   PHYSICAL EXAM:      T(C): 36.7 (04-09-20 @ 13:21), Max: 36.7 (04-09-20 @ 13:21)  HR: 95 (04-09-20 @ 13:21) (85 - 95)  BP: 148/91 (04-09-20 @ 13:21) (148/69 - 148/91)  RR: 18 (04-09-20 @ 13:21) (18 - 20)  SpO2: 90% (04-09-20 @ 13:21) (90% - 90%)    MEDICATIONS  (STANDING):  enoxaparin Injectable 30 milliGRAM(s) SubCutaneous two times a day  furosemide    Tablet 20 milliGRAM(s) Oral daily  hydroxychloroquine 200 milliGRAM(s) Oral every 12 hours  hydroxychloroquine   Oral   levothyroxine 50 MICROGram(s) Oral daily  methylPREDNISolone sodium succinate Injectable 40 milliGRAM(s) IV Push two times a day  simvastatin 40 milliGRAM(s) Oral at bedtime    MEDICATIONS  (PRN):  acetaminophen   Tablet .. 650 milliGRAM(s) Oral every 4 hours PRN Temp greater or equal to 38.5C (101.3F)      GENERAL: flushed, acutely ill appearing.   HEAD:  Atraumatic, Normocephalic  EYES: EOMI, conjunctiva and sclera clear  ENMT: Moist mucous membranes, on NRB  NECK: Supple, No JVD  NERVOUS SYSTEM:  Alert & Oriented X3, Motor Strength 5/5 B/L upper and lower extremities; DTRs 2+ intact and symmetric  CHEST/LUNG: crackles at bases, no rales, rhonchi, wheezing, or rubs  HEART: Regular rate and rhythm; No murmurs, rubs, or gallops  ABDOMEN: Soft, Nontender, Nondistended; Bowel sounds present  EXTREMITIES:  2+ Peripheral Pulses, No clubbing, cyanosis, or edema  MEDICATIONS  (STANDING):  atazanavir 300 milliGRAM(s) Oral daily  enoxaparin Injectable 30 milliGRAM(s) SubCutaneous two times a day  hydroxychloroquine 200 milliGRAM(s) Oral every 12 hours  hydroxychloroquine   Oral   levothyroxine 50 MICROGram(s) Oral daily  methylPREDNISolone sodium succinate Injectable 40 milliGRAM(s) IV Push two times a day  simvastatin 40 milliGRAM(s) Oral at bedtime                            13.8   11.99 )-----------( 223      ( 08 Apr 2020 09:24 )             40.8       CAPILLARY BLOOD GLUCOSE              CAPILLARY BLOOD GLUCOSE              CAPILLARY BLOOD GLUCOSE                RECENT CULTURES:    RADIOLOGY & ADDITIONAL TESTS:    DVT/GI ppx  Discussed with pt @ bedside Patient is a 75y old  Male who presents with a chief complaint of dyspnea (07 Apr 2020 10:51)    HPI:  Patient is a 74YO M with a PMH of hypothryoidism and HLD who presents to the ED for fever and dyspena.  Patient admitted for evaluation for COVID19.  Has had symptoms for 7 days.  Symptoms include fever, chills, cough, generalized weakness and increased dyspnea.  Cough has been nonproductive.  Denies hx of nausea, vomiting, diarrhea.  Denies prior testing for COVID19 and denies recent exposure to anyone with known COVID19.  Nonsmoker, NKDA.  Currently 91% on NRB.  Febrile to 101.8. in ED.  Labs benign.  CXR shows bilateral opacities.  Will admit to floor. (05 Apr 2020 01:40)    SUBJECTIVE & OBJECTIVE: Pt seen and examined at bedside. On NRB. Placed in prone position         T(C): 36.7 (04-09-20 @ 13:21), Max: 36.7 (04-09-20 @ 13:21)  HR: 95 (04-09-20 @ 13:21) (85 - 95)  BP: 148/91 (04-09-20 @ 13:21) (148/69 - 148/91)  RR: 18 (04-09-20 @ 13:21) (18 - 20)  SpO2: 90% (04-09-20 @ 13:21) (90% - 90%)    MEDICATIONS  (STANDING):  enoxaparin Injectable 30 milliGRAM(s) SubCutaneous two times a day  furosemide    Tablet 20 milliGRAM(s) Oral daily  hydroxychloroquine 200 milliGRAM(s) Oral every 12 hours  hydroxychloroquine   Oral   levothyroxine 50 MICROGram(s) Oral daily  methylPREDNISolone sodium succinate Injectable 40 milliGRAM(s) IV Push two times a day  simvastatin 40 milliGRAM(s) Oral at bedtime    MEDICATIONS  (PRN):  acetaminophen   Tablet .. 650 milliGRAM(s) Oral every 4 hours PRN Temp greater or equal to 38.5C (101.3F)      GENERAL: flushed, acutely ill appearing.   HEAD:  Atraumatic, Normocephalic  EYES: EOMI, conjunctiva and sclera clear  ENMT: Moist mucous membranes, on NRB  NECK: Supple, No JVD  NERVOUS SYSTEM:  Alert & Oriented X3, Motor Strength 5/5 B/L upper and lower extremities; DTRs 2+ intact and symmetric  CHEST/LUNG: crackles at bases, no rales, rhonchi, wheezing, or rubs  HEART: Regular rate and rhythm; No murmurs, rubs, or gallops  ABDOMEN: Soft, Nontender, Nondistended; Bowel sounds present  EXTREMITIES:  2+ Peripheral Pulses, No clubbing, cyanosis, or edema  MEDICATIONS  (STANDING):  atazanavir 300 milliGRAM(s) Oral daily  enoxaparin Injectable 30 milliGRAM(s) SubCutaneous two times a day  hydroxychloroquine 200 milliGRAM(s) Oral every 12 hours  hydroxychloroquine   Oral   levothyroxine 50 MICROGram(s) Oral daily  methylPREDNISolone sodium succinate Injectable 40 milliGRAM(s) IV Push two times a day  simvastatin 40 milliGRAM(s) Oral at bedtime                            13.8   11.99 )-----------( 223      ( 08 Apr 2020 09:24 )             40.8       CAPILLARY BLOOD GLUCOSE              CAPILLARY BLOOD GLUCOSE              CAPILLARY BLOOD GLUCOSE                RECENT CULTURES:    RADIOLOGY & ADDITIONAL TESTS:    DVT/GI ppx  Discussed with pt @ bedside

## 2020-04-09 NOTE — PROGRESS NOTE ADULT - ASSESSMENT
Patient is a 74YO M with a PMH of hypothryoidism and HLD who presents to the ED for fever and dyspena.    Problem/Plan - 1:  ·  Problem: Pneumonia due to infectious organism, unspecified laterality, unspecified part of lung.    Plan: COVID 19 Detected.  Flu/RSV negative. Isolation precautions  - on Reyataz and Plaquenil  - on Solumedrol  - on Lovenox 30 mg SC Q12 hours  - trending d-dimer, ldh, crp, ferritin   - DAILY PRONE POSITIONING     Problem/Plan - 2:  ·  Problem: Hyponatremia.    Plan: trending daily resolved   - fluid restriction     Problem/Plan - 3:  ·  Problem: Hypothyroidism, unspecified type.  Plan: synthroid.     Problem/Plan - 4:  ·  Problem: Dyslipidemia.  Plan: simvastatin.   Plan discussed with patients daughter Sylvia

## 2020-04-09 NOTE — DIETITIAN INITIAL EVALUATION ADULT. - PERTINENT LABORATORY DATA
04-08 Na135 mmol/L Glu 260 mg/dL<H> K+ 4.6 mmol/L Cr  1.13 mg/dL BUN 23 mg/dL 04-08 Phos 2.7 mg/dL 04-04 Alb 3.2 g/dL<L>04-04 ALT 23 U/L AST 32 U/L Alkaline Phosphatase 44 U/L

## 2020-04-09 NOTE — DIETITIAN INITIAL EVALUATION ADULT. - DIET TYPE
4/5 - Clear liquid [place] 8 twice per day (440 rosanne, 20 gm pro)/consistent carbohydrate (evening snack)/supplement (specify)

## 2020-04-10 NOTE — PROGRESS NOTE ADULT - ASSESSMENT
Patient is a 74YO M with a PMH of hypothryoidism and HLD who presents to the ED for fever and dyspena.    Problem/Plan - 1:  ·  Problem: Pneumonia due to infectious organism, unspecified laterality, unspecified part of lung.    Plan: COVID 19 Detected.  Flu/RSV negative. Isolation precautions  - on Reyataz and Plaquenil  - on Solumedrol  - on Lovenox 30 mg SC Q12 hours  - trending d-dimer, ldh, crp, ferritin   - DAILY PRONE POSITIONING     Problem/Plan - 2:  ·  Problem: Hyponatremia.    Plan: likely from Lasix, d/c Lasix    - fluid restriction     Problem/Plan - 3:  ·  Problem: Hypothyroidism, unspecified type.  Plan: synthroid.     Problem/Plan - 4:  ·  Problem: Dyslipidemia.  Plan: simvastatin.   Plan discussed with patients daughter Sylvia

## 2020-04-10 NOTE — PROGRESS NOTE ADULT - SUBJECTIVE AND OBJECTIVE BOX
Patient is a 75y old  Male who presents with a chief complaint of dyspnea (07 Apr 2020 10:51)    HPI:  Patient is a 74YO M with a PMH of hypothryoidism and HLD who presents to the ED for fever and dyspena.  Patient admitted for evaluation for COVID19.  Has had symptoms for 7 days.  Symptoms include fever, chills, cough, generalized weakness and increased dyspnea.  Cough has been nonproductive.  Denies hx of nausea, vomiting, diarrhea.  Denies prior testing for COVID19 and denies recent exposure to anyone with known COVID19.  Nonsmoker, NKDA.  Currently 91% on NRB.  Febrile to 101.8. in ED.  Labs benign.  CXR shows bilateral opacities.  Will admit to floor. (05 Apr 2020 01:40)    SUBJECTIVE & OBJECTIVE: Pt seen and examined at bedside. On NRB. Placed in prone position       T(C): 36.7 (04-09-20 @ 13:21), Max: 36.7 (04-09-20 @ 13:21)  HR: 95 (04-09-20 @ 13:21) (85 - 95)  BP: 148/91 (04-09-20 @ 13:21) (148/69 - 148/91)  RR: 18 (04-09-20 @ 13:21) (18 - 20)  SpO2: 90% (04-09-20 @ 13:21) (90% - 90%)    MEDICATIONS  (STANDING):  enoxaparin Injectable 30 milliGRAM(s) SubCutaneous two times a day  furosemide    Tablet 20 milliGRAM(s) Oral daily  hydroxychloroquine 200 milliGRAM(s) Oral every 12 hours  hydroxychloroquine   Oral   levothyroxine 50 MICROGram(s) Oral daily  methylPREDNISolone sodium succinate Injectable 40 milliGRAM(s) IV Push two times a day  simvastatin 40 milliGRAM(s) Oral at bedtime    MEDICATIONS  (PRN):  acetaminophen   Tablet .. 650 milliGRAM(s) Oral every 4 hours PRN Temp greater or equal to 38.5C (101.3F)      GENERAL: flushed, acutely ill appearing.   HEAD:  Atraumatic, Normocephalic  EYES: EOMI, conjunctiva and sclera clear  ENMT: Moist mucous membranes, on NRB  NECK: Supple, No JVD  NERVOUS SYSTEM:  Alert & Oriented X3, Motor Strength 5/5 B/L upper and lower extremities; DTRs 2+ intact and symmetric  CHEST/LUNG: crackles at bases, no rales, rhonchi, wheezing, or rubs  HEART: Regular rate and rhythm; No murmurs, rubs, or gallops  ABDOMEN: Soft, Nontender, Nondistended; Bowel sounds present  EXTREMITIES:  2+ Peripheral Pulses, No clubbing, cyanosis, or edema  MEDICATIONS  (STANDING):  atazanavir 300 milliGRAM(s) Oral daily  enoxaparin Injectable 30 milliGRAM(s) SubCutaneous two times a day  hydroxychloroquine 200 milliGRAM(s) Oral every 12 hours  hydroxychloroquine   Oral   levothyroxine 50 MICROGram(s) Oral daily  methylPREDNISolone sodium succinate Injectable 40 milliGRAM(s) IV Push two times a day  simvastatin 40 milliGRAM(s) Oral at bedtime                            13.8   11.99 )-----------( 223      ( 08 Apr 2020 09:24 )             40.8       CAPILLARY BLOOD GLUCOSE              CAPILLARY BLOOD GLUCOSE              CAPILLARY BLOOD GLUCOSE                RECENT CULTURES:    RADIOLOGY & ADDITIONAL TESTS:    DVT/GI ppx  Discussed with pt @ bedside

## 2020-04-11 NOTE — PHARMACY COMMUNICATION NOTE - COMMENTS
Patient is being switched from oral to IV; however, IV dose is ordered for 4/11. Patient already received the oral dose for today. Clarified with the NP; will switch the IV dose to commence tomorrow at . Thank you

## 2020-04-11 NOTE — PHARMACY COMMUNICATION NOTE - COMMENTS
Patient has two orders for Acetaminophen 650 mg q6h and q4h by two different prescribers. Clarified with the NP the need for one order. She requested to have one order discontinued and keep her order on. Thank you

## 2020-04-11 NOTE — CONSULT NOTE ADULT - ASSESSMENT
Pt currently with hypoxic respiratory failure secondary to SARS-CoV2 (COVID 19) plan to implement Low tidal volume ventilation strategy with 6ml/kg/IBW, will utilize Fio2 and PEEP to obtain P to F ratio >150 ensuring Plateau pressures remain <30 in order to avoid baurotruama. Ensure pt is adequately sedated with goal RASS -4 to 5 using propofol and fentanyl when possible. If patients develops increasing oxygen requirements will initiate paralytics and consider prone ventilation for 16hs alternating with 8hrs supine. Will track indices of severity of illness with daily cbc w/ diff for lymphopenia, coags, D-dimer, procal, ESR, CRP, troponin, LDH, Ferritin and troponins.  plaquenil and azithro fnished. will add anakinra steroids ful dose ac. central line to be placed.  prognosis guarded

## 2020-04-11 NOTE — CONSULT NOTE ADULT - SUBJECTIVE AND OBJECTIVE BOX
Patient is a 75y old  Male who presents with a chief complaint of dyspnea (10 Apr 2020 10:15)      HPI:  Patient is a 76YO M with a PMH of hypothryoidism and HLD who presents to the ED for fever and dyspena.  Patient admitted for evaluation for COVID19.  Has had symptoms for 7 days.  Symptoms include fever, chills, cough, generalized weakness and   increased dyspnea.  Cough has been nonproductive.  Denies hx of nausea, vomiting, diarrhea.  Denies prior testing for COVID19 and   denies recent exposure to anyone with known COVID19.  Nonsmoker, NKDA.  Currently 91% on NRB.  Febrile to 101.8. in ED.  Labs benign.  CXR shows bilateral opacities.  Will admit to floor. (05 Apr 2020 01:40)    Patient desated to mid 80's unable to improve with nrb and NC. intubated for hypoxic resp failure    Allergies    No Known Allergies    Intolerances        MEDICATIONS  (STANDING):  chlorhexidine 4% Liquid 1 Application(s) Topical <User Schedule>  enoxaparin Injectable 30 milliGRAM(s) SubCutaneous two times a day  fentaNYL   Infusion. 0.5 MICROgram(s)/kG/Hr (3.63 mL/Hr) IV Continuous <Continuous>  levothyroxine Injectable 25 MICROGram(s) IV Push at bedtime  methylPREDNISolone sodium succinate Injectable 40 milliGRAM(s) IV Push every 12 hours  midazolam Infusion 0.02 mG/kG/Hr (1.45 mL/Hr) IV Continuous <Continuous>  midazolam Injectable 4 milliGRAM(s) IV Push once  midodrine 10 milliGRAM(s) Oral every 8 hours  norepinephrine Infusion 0.05 MICROgram(s)/kG/Min (6.81 mL/Hr) IV Continuous <Continuous>  propofol Infusion 10 MICROgram(s)/kG/Min (4.36 mL/Hr) IV Continuous <Continuous>  simvastatin 40 milliGRAM(s) Oral at bedtime    MEDICATIONS  (PRN):  acetaminophen   Tablet .. 650 milliGRAM(s) Oral every 4 hours PRN Temp greater or equal to 38.5C (101.3F)      Daily     Daily     Drug Dosing Weight  Height (cm): 172.72 (04 Apr 2020 21:27)  Weight (kg): 72.6 (04 Apr 2020 21:27)  BMI (kg/m2): 24.3 (04 Apr 2020 21:27)  BSA (m2): 1.86 (04 Apr 2020 21:27)    PAST MEDICAL & SURGICAL HISTORY:  Thyroid disease  No significant past surgical history      FAMILY HISTORY:  FH: HTN (hypertension)      SOCIAL HISTORY:unable to assess 2/2 mental status    ADVANCE DIRECTIVES: full code    REVIEW OF SYSTEMS:    unable to assess ros due to mental status       ICU Vital Signs Last 24 Hrs  T(C): 36.2 (11 Apr 2020 04:45), Max: 36.8 (10 Apr 2020 17:36)  T(F): 97.2 (11 Apr 2020 04:45), Max: 98.3 (10 Apr 2020 17:36)  HR: 93 (11 Apr 2020 04:45) (89 - 99)  BP: 138/88 (11 Apr 2020 04:45) (138/88 - 159/89)  BP(mean): --  ABP: --  ABP(mean): --  RR: 18 (11 Apr 2020 06:43) (17 - 18)  SpO2: 92% (11 Apr 2020 06:43) (86% - 92%)          I&O's Detail    10 Apr 2020 07:01  -  11 Apr 2020 07:00  --------------------------------------------------------  IN:  Total IN: 0 mL    OUT:    Voided: 400 mL  Total OUT: 400 mL    Total NET: -400 mL          PHYSICAL EXAM:    CON : NAD  EENT : EOMI, MMM  NECK : Full ROM  RESP : CTAB no increased WOB  CARD : rrr no m/r/g  ABD : S NT ND NABS no rebound  EXT : No edema  NEURO : intubated and sedated    LABS:  CBC Full  -  ( 10 Apr 2020 06:48 )  WBC Count : 15.09 K/uL  RBC Count : 5.07 M/uL  Hemoglobin : 14.6 g/dL  Hematocrit : 42.9 %  Platelet Count - Automated : 231 K/uL  Mean Cell Volume : 84.6 fl  Mean Cell Hemoglobin : 28.8 pg  Mean Cell Hemoglobin Concentration : 34.0 gm/dL  Auto Neutrophil # : 12.42 K/uL  Auto Lymphocyte # : 1.08 K/uL  Auto Monocyte # : 1.22 K/uL  Auto Eosinophil # : 0.00 K/uL  Auto Basophil # : 0.04 K/uL  Auto Neutrophil % : 82.2 %  Auto Lymphocyte % : 7.2 %  Auto Monocyte % : 8.1 %  Auto Eosinophil % : 0.0 %  Auto Basophil % : 0.3 %    04-10    127<L>  |  94<L>  |  23  ----------------------------<  364<H>  5.1   |  25  |  1.12    Ca    8.1<L>      10 Apr 2020 06:48      CAPILLARY BLOOD GLUCOSE      POCT Blood Glucose.: 398 mg/dL (11 Apr 2020 10:12)      RADIOLOGY:post intubated cxr pending    CRITICAL CARE TIME SPENT: 45 min

## 2020-04-11 NOTE — CHART NOTE - NSCHARTNOTEFT_GEN_A_CORE
S/P Rapid Response, intubated.  Pt's daughter Sylvia  was informed of pt's condition and ICU transfer at approximately 11:00AM. S/P Rapid Response, intubated and transferred to ICU.  Pt's daughter Sylvia  was informed of pt's condition at approximately 11:00AM.

## 2020-04-11 NOTE — AIRWAY PLACEMENT NOTE ADULT - POST AIRWAY PLACEMENT ASSESSMENT:
tube seen past the cords, attached to vent, sats improved/positive end tidal CO2 noted/CXR pending/breath sounds equal/chest excursion noted/breath sounds bilateral

## 2020-04-11 NOTE — PROCEDURE NOTE - NSINDICATIONS_GEN_A_CORE
venous access/volume resuscitation/hypertonic/irritant infusion/critical illness/emergency venous access

## 2020-04-12 NOTE — PROGRESS NOTE ADULT - ASSESSMENT
76 y/o M admitted for COVID infection now w/acute hypoxemic respiratory failure secondary to ARDS in setting of COVID infection. Hypotension likely secondary to severe sepsis with septic shock. DESMOND likely secondary to ATN/sepsis/COVID. Hyponatremia.     - Heavy sedation/paralytics/possible proning  - Continue mechanical ventilation  - Titrate pressors as needed goal MAP >= 65  - Anakinra/steroids/completed plaquenil  - Trend Cr avoid nephrotoxins  - Free water restriction  - DVT prophylaxis  - Trial of diuresis    I have personally provided 35 minutes of attending critical care time excluding procedures.

## 2020-04-12 NOTE — PROGRESS NOTE ADULT - SUBJECTIVE AND OBJECTIVE BOX
HPI:  Patient is a 74YO M with a PMH of hypothryoidism and HLD who presents to the ED for fever and dyspena.  Patient admitted for evaluation for COVID19.  Has had symptoms for 7 days.  Symptoms include fever, chills, cough, generalized weakness and   increased dyspnea.  Cough has been nonproductive.  Denies hx of nausea, vomiting, diarrhea.  Denies prior testing for COVID19 and   denies recent exposure to anyone with known COVID19.  Nonsmoker, NKDA.  Currently 91% on NRB.  Febrile to 101.8. in ED.  Labs benign.  CXR shows bilateral opacities.  Will admit to floor. (05 Apr 2020 01:40)      24 hr events: Intubated and transferred to ICU.    ## ROS:  [x ] unable to obtain    ## Vitals  ICU Vital Signs Last 24 Hrs  T(C): 36.3 (12 Apr 2020 08:04), Max: 38.3 (11 Apr 2020 16:02)  T(F): 97.3 (12 Apr 2020 08:04), Max: 100.9 (11 Apr 2020 16:02)  HR: 69 (12 Apr 2020 09:00) (66 - 150)  BP: 127/68 (12 Apr 2020 09:00) (87/49 - 196/118)  BP(mean): 82 (12 Apr 2020 09:00) (76 - 94)  ABP: --  ABP(mean): --  RR: 20 (12 Apr 2020 09:00) (20 - 34)  SpO2: 96% (12 Apr 2020 09:00) (76% - 99%)    ## Vent Data  Mode: AC/ CMV (Assist Control/ Continuous Mandatory Ventilation)  RR (machine): 20  TV (machine): 450  FiO2: 90  PEEP: 15  ITime: 1  MAP: 21  PIP: 35      ## Labs:  Chem:  04-12    132<L>  |  97  |  37<H>  ----------------------------<  320<H>  5.2   |  25  |  1.61<H>    Ca    8.0<L>      12 Apr 2020 03:00  Phos  6.2     04-12  Mg     3.3     04-12    TPro  7.1  /  Alb  2.6<L>  /  TBili  1.0  /  DBili  x   /  AST  36  /  ALT  55  /  AlkPhos  94  04-12    LIVER FUNCTIONS - ( 12 Apr 2020 03:00 )  Alb: 2.6 g/dL / Pro: 7.1 gm/dL / ALK PHOS: 94 U/L / ALT: 55 U/L / AST: 36 U/L / GGT: x           CBC:                        14.4   17.28 )-----------( 186      ( 12 Apr 2020 03:00 )             44.5     Coags:  PT/INR - ( 11 Apr 2020 13:28 )   PT: 13.4 sec;   INR: 1.19 ratio         PTT - ( 11 Apr 2020 13:28 )  PTT:25.4 sec        ## Cardiac  CARDIAC MARKERS ( 11 Apr 2020 13:28 )  .071 ng/mL / x     / 91 U/L / x     / x            ## Blood Gas  ABG - ( 11 Apr 2020 12:24 )  pH, Arterial: x     pH, Blood: 7.40  /  pCO2: 43    /  pO2: 87    / HCO3: 27    / Base Excess: 1.9   /  SaO2: 59                  #I/Os  I&O's Detail    11 Apr 2020 07:01  -  12 Apr 2020 07:00  --------------------------------------------------------  IN:    cisatracurium Infusion: 93 mL    fentaNYL Infusion.: 182 mL    midazolam Infusion: 65 mL    norepinephrine Infusion: 114.5 mL    propofol Infusion: 163.8 mL  Total IN: 618.3 mL    OUT:    Indwelling Catheter - Urethral: 950 mL  Total OUT: 950 mL    Total NET: -331.7 mL          ## Imaging:    ## Medications:  MEDICATIONS  (STANDING):  anakinra Injectable 100 milliGRAM(s) SubCutaneous every 6 hours  chlorhexidine 0.12% Liquid 15 milliLiter(s) Oral Mucosa every 12 hours  chlorhexidine 4% Liquid 1 Application(s) Topical <User Schedule>  cisatracurium Infusion 3 MICROgram(s)/kG/Min (13.1 mL/Hr) IV Continuous <Continuous>  dextrose 5%. 1000 milliLiter(s) (50 mL/Hr) IV Continuous <Continuous>  dextrose 50% Injectable 12.5 Gram(s) IV Push once  dextrose 50% Injectable 25 Gram(s) IV Push once  dextrose 50% Injectable 25 Gram(s) IV Push once  enoxaparin Injectable 30 milliGRAM(s) SubCutaneous two times a day  fentaNYL   Infusion. 0.5 MICROgram(s)/kG/Hr (3.63 mL/Hr) IV Continuous <Continuous>  insulin lispro (HumaLOG) corrective regimen sliding scale   SubCutaneous every 6 hours  levothyroxine Injectable 25 MICROGram(s) IV Push at bedtime  methylPREDNISolone sodium succinate Injectable 40 milliGRAM(s) IV Push every 12 hours  midazolam Infusion 0.02 mG/kG/Hr (1.45 mL/Hr) IV Continuous <Continuous>  midazolam Injectable 4 milliGRAM(s) IV Push once  midodrine 10 milliGRAM(s) Oral every 8 hours  norepinephrine Infusion 0.05 MICROgram(s)/kG/Min (6.81 mL/Hr) IV Continuous <Continuous>  propofol Infusion 10 MICROgram(s)/kG/Min (4.36 mL/Hr) IV Continuous <Continuous>  simvastatin 40 milliGRAM(s) Oral at bedtime    MEDICATIONS  (PRN):  acetaminophen   Tablet .. 650 milliGRAM(s) Oral every 6 hours PRN Temp greater or equal to 38C (100.4F)  dextrose 40% Gel 15 Gram(s) Oral once PRN Blood Glucose LESS THAN 70 milliGRAM(s)/deciliter  glucagon  Injectable 1 milliGRAM(s) IntraMuscular once PRN Glucose LESS THAN 70 milligrams/deciliter

## 2020-04-13 NOTE — PROGRESS NOTE ADULT - SUBJECTIVE AND OBJECTIVE BOX
HPI:  Patient is a 76YO M with a PMH of hypothryoidism and HLD who presents to the ED for fever and dyspena.  Patient admitted for evaluation for COVID19.  Has had symptoms for 7 days.  Symptoms include fever, chills, cough, generalized weakness and   increased dyspnea.  Cough has been nonproductive.  Denies hx of nausea, vomiting, diarrhea.  Denies prior testing for COVID19 and   denies recent exposure to anyone with known COVID19.  Nonsmoker, NKDA.  Currently 91% on NRB.  Febrile to 101.8. in ED.  Labs benign.  CXR shows bilateral opacities.  Will admit to floor. (05 Apr 2020 01:40)      24 hr events: No acute events.     ## ROS:  [x ] unable to obtain    ## Vitals  ICU Vital Signs Last 24 Hrs  T(C): 38.6 (13 Apr 2020 06:33), Max: 38.6 (13 Apr 2020 06:33)  T(F): 101.4 (13 Apr 2020 06:33), Max: 101.4 (13 Apr 2020 06:33)  HR: 81 (13 Apr 2020 08:00) (69 - 90)  BP: 100/59 (13 Apr 2020 08:00) (52/39 - 179/87)  BP(mean): 69 (13 Apr 2020 08:00) (42 - 108)  ABP: --  ABP(mean): --  RR: 20 (13 Apr 2020 08:00) (20 - 20)  SpO2: 94% (13 Apr 2020 08:00) (91% - 99%)    ## Vent Data  Mode: AC/ CMV (Assist Control/ Continuous Mandatory Ventilation)  RR (machine): 20  TV (machine): 450  FiO2: 50  PEEP: 12  ITime: 1  MAP: 20  PIP: 30      ## Labs:  Chem:  04-13    136  |  95<L>  |  55<H>  ----------------------------<  290<H>  4.7   |  30  |  2.42<H>    Ca    7.9<L>      13 Apr 2020 04:38  Phos  5.5     04-13  Mg     3.3     04-13    TPro  7.2  /  Alb  2.5<L>  /  TBili  0.6  /  DBili  .14  /  AST  43<H>  /  ALT  47  /  AlkPhos  82  04-13    LIVER FUNCTIONS - ( 13 Apr 2020 04:38 )  Alb: 2.5 g/dL / Pro: 7.2 gm/dL / ALK PHOS: 82 U/L / ALT: 47 U/L / AST: 43 U/L / GGT: x           CBC:                        14.2   11.46 )-----------( 220      ( 13 Apr 2020 04:38 )             42.9     Coags:  PT/INR - ( 11 Apr 2020 13:28 )   PT: 13.4 sec;   INR: 1.19 ratio         PTT - ( 11 Apr 2020 13:28 )  PTT:25.4 sec        ## Cardiac  CARDIAC MARKERS ( 11 Apr 2020 13:28 )  .071 ng/mL / x     / 91 U/L / x     / x            ## Blood Gas  ABG - ( 12 Apr 2020 13:03 )  pH, Arterial: x     pH, Blood: 7.38  /  pCO2: 53    /  pO2: 95    / HCO3: 31    / Base Excess: 4.8   /  SaO2: 95                  #I/Os  I&O's Detail    12 Apr 2020 07:01  -  13 Apr 2020 07:00  --------------------------------------------------------  IN:    bumetanide Infusion: 160 mL    cisatracurium Infusion: 151.2 mL    fentaNYL Infusion.: 336 mL    Glucerna: 300 mL    midazolam Infusion: 120 mL    norepinephrine Infusion: 115.6 mL    propofol Infusion: 297.9 mL  Total IN: 1480.7 mL    OUT:    Indwelling Catheter - Urethral: 850 mL  Total OUT: 850 mL    Total NET: 630.7 mL          ## Imaging:    ## Medications:  MEDICATIONS  (STANDING):  acetaminophen  IVPB .. 1000 milliGRAM(s) IV Intermittent once  anakinra Injectable 100 milliGRAM(s) SubCutaneous every 6 hours  buMETAnide Infusion 2 mG/Hr (10 mL/Hr) IV Continuous <Continuous>  chlorhexidine 0.12% Liquid 15 milliLiter(s) Oral Mucosa every 12 hours  chlorhexidine 4% Liquid 1 Application(s) Topical <User Schedule>  cisatracurium Infusion 3 MICROgram(s)/kG/Min (13.1 mL/Hr) IV Continuous <Continuous>  dextrose 5%. 1000 milliLiter(s) (50 mL/Hr) IV Continuous <Continuous>  dextrose 50% Injectable 12.5 Gram(s) IV Push once  dextrose 50% Injectable 25 Gram(s) IV Push once  dextrose 50% Injectable 25 Gram(s) IV Push once  enoxaparin Injectable 30 milliGRAM(s) SubCutaneous two times a day  fentaNYL   Infusion. 0.5 MICROgram(s)/kG/Hr (3.63 mL/Hr) IV Continuous <Continuous>  insulin glargine Injectable (LANTUS) 14 Unit(s) SubCutaneous every morning  insulin lispro (HumaLOG) corrective regimen sliding scale   SubCutaneous every 6 hours  levothyroxine Injectable 25 MICROGram(s) IV Push at bedtime  methylPREDNISolone sodium succinate Injectable 40 milliGRAM(s) IV Push every 12 hours  midazolam Infusion 0.02 mG/kG/Hr (1.45 mL/Hr) IV Continuous <Continuous>  midazolam Injectable 4 milliGRAM(s) IV Push once  midodrine 10 milliGRAM(s) Oral every 8 hours  norepinephrine Infusion 0.05 MICROgram(s)/kG/Min (6.81 mL/Hr) IV Continuous <Continuous>  propofol Infusion 10 MICROgram(s)/kG/Min (4.36 mL/Hr) IV Continuous <Continuous>  simvastatin 40 milliGRAM(s) Oral at bedtime    MEDICATIONS  (PRN):  acetaminophen   Tablet .. 650 milliGRAM(s) Oral every 6 hours PRN Temp greater or equal to 38C (100.4F)  dextrose 40% Gel 15 Gram(s) Oral once PRN Blood Glucose LESS THAN 70 milliGRAM(s)/deciliter  glucagon  Injectable 1 milliGRAM(s) IntraMuscular once PRN Glucose LESS THAN 70 milligrams/deciliter

## 2020-04-13 NOTE — PROGRESS NOTE ADULT - ASSESSMENT
74 y/o M admitted for COVID infection now w/acute hypoxemic respiratory failure secondary to ARDS in setting of COVID infection. Hypotension likely secondary to severe sepsis with septic shock. DESMOND likely secondary to ATN/sepsis/COVID. Hyponatremia.     - Heavy sedation/paralytics  - Continue mechanical ventilation  - Titrate pressors as needed goal MAP >= 65  - Anakinra/steroids/completed plaquenil  - Trend Cr avoid nephrotoxins  - Insulin for hyperglycemia  - DVT prophylaxis  - Continue attempt at diuresis    I have personally provided 35 minutes of attending critical care time excluding procedures.

## 2020-04-14 NOTE — PROGRESS NOTE ADULT - SUBJECTIVE AND OBJECTIVE BOX
HPI:  Patient is a 76YO M with a PMH of hypothryoidism and HLD who presents to the ED for fever and dyspena.  Patient admitted for evaluation for COVID19.  Has had symptoms for 7 days.  Symptoms include fever, chills, cough, generalized weakness and   increased dyspnea.  Cough has been nonproductive.  Denies hx of nausea, vomiting, diarrhea.  Denies prior testing for COVID19 and   denies recent exposure to anyone with known COVID19.  Nonsmoker, NKDA.  Currently 91% on NRB.  Febrile to 101.8. in ED.  Labs benign.  CXR shows bilateral opacities.  Will admit to floor. (05 Apr 2020 01:40)      24 hr events: No acute events    ## ROS:  [x ] unable to obtain    ## Vitals  ICU Vital Signs Last 24 Hrs  T(C): 38.4 (14 Apr 2020 03:31), Max: 39.1 (13 Apr 2020 09:00)  T(F): 101.2 (14 Apr 2020 03:31), Max: 102.4 (13 Apr 2020 09:00)  HR: 70 (14 Apr 2020 08:00) (70 - 85)  BP: 111/61 (14 Apr 2020 08:00) (95/57 - 166/77)  BP(mean): 69 (14 Apr 2020 08:00) (65 - 96)  ABP: --  ABP(mean): --  RR: 20 (14 Apr 2020 08:00) (20 - 21)  SpO2: 95% (14 Apr 2020 08:00) (93% - 96%)    ## Vent Data  Mode: AC/ CMV (Assist Control/ Continuous Mandatory Ventilation)  RR (machine): 20  TV (machine): 450  FiO2: 50  PEEP: 12  ITime: 1  MAP: 18  PIP: 32      ## Labs:  Chem:  04-14    139  |  95<L>  |  81<H>  ----------------------------<  305<H>  4.1   |  32<H>  |  3.27<H>    Ca    7.1<L>      14 Apr 2020 04:02  Phos  8.0     04-14  Mg     3.2     04-14    TPro  6.7  /  Alb  2.5<L>  /  TBili  0.5  /  DBili  .19  /  AST  28  /  ALT  38  /  AlkPhos  72  04-14    LIVER FUNCTIONS - ( 14 Apr 2020 04:02 )  Alb: 2.5 g/dL / Pro: 6.7 gm/dL / ALK PHOS: 72 U/L / ALT: 38 U/L / AST: 28 U/L / GGT: x           CBC:                        11.8   8.91  )-----------( 127      ( 14 Apr 2020 04:02 )             36.0     Coags:          ## Cardiac        ## Blood Gas  ABG - ( 12 Apr 2020 13:03 )  pH, Arterial: x     pH, Blood: 7.38  /  pCO2: 53    /  pO2: 95    / HCO3: 31    / Base Excess: 4.8   /  SaO2: 95                  #I/Os  I&O's Detail    13 Apr 2020 07:01  -  14 Apr 2020 07:00  --------------------------------------------------------  IN:    bumetanide Infusion: 331 mL    cisatracurium Infusion: 119.7 mL    Enteral Tube Flush: 150 mL    fentaNYL Infusion.: 336 mL    Glucerna: 300 mL    midazolam Infusion: 120 mL    norepinephrine Infusion: 141.1 mL    propofol Infusion: 302.4 mL  Total IN: 1800.2 mL    OUT:    Indwelling Catheter - Urethral: 3100 mL  Total OUT: 3100 mL    Total NET: -1299.8 mL      14 Apr 2020 07:01  -  14 Apr 2020 08:44  --------------------------------------------------------  IN:    bumetanide Infusion: 20 mL    cisatracurium Infusion: 8.4 mL    fentaNYL Infusion.: 28 mL    midazolam Infusion: 10 mL    propofol Infusion: 25.2 mL  Total IN: 91.6 mL    OUT:    Indwelling Catheter - Urethral: 150 mL  Total OUT: 150 mL    Total NET: -58.4 mL          ## Imaging:    ## Medications:  MEDICATIONS  (STANDING):  buMETAnide Infusion 2 mG/Hr (10 mL/Hr) IV Continuous <Continuous>  chlorhexidine 0.12% Liquid 15 milliLiter(s) Oral Mucosa every 12 hours  chlorhexidine 4% Liquid 1 Application(s) Topical <User Schedule>  cisatracurium Infusion 3 MICROgram(s)/kG/Min (13.1 mL/Hr) IV Continuous <Continuous>  dextrose 5%. 1000 milliLiter(s) (50 mL/Hr) IV Continuous <Continuous>  dextrose 50% Injectable 12.5 Gram(s) IV Push once  dextrose 50% Injectable 25 Gram(s) IV Push once  dextrose 50% Injectable 25 Gram(s) IV Push once  enoxaparin Injectable 30 milliGRAM(s) SubCutaneous two times a day  fentaNYL   Infusion. 0.5 MICROgram(s)/kG/Hr (3.63 mL/Hr) IV Continuous <Continuous>  insulin glargine Injectable (LANTUS) 20 Unit(s) SubCutaneous two times a day  insulin lispro (HumaLOG) corrective regimen sliding scale   SubCutaneous every 6 hours  levothyroxine Injectable 25 MICROGram(s) IV Push at bedtime  methylPREDNISolone sodium succinate Injectable 40 milliGRAM(s) IV Push every 12 hours  midazolam Infusion 0.02 mG/kG/Hr (1.45 mL/Hr) IV Continuous <Continuous>  midazolam Injectable 4 milliGRAM(s) IV Push once  midodrine 10 milliGRAM(s) Oral every 8 hours  norepinephrine Infusion 0.05 MICROgram(s)/kG/Min (6.81 mL/Hr) IV Continuous <Continuous>  propofol Infusion 10 MICROgram(s)/kG/Min (4.36 mL/Hr) IV Continuous <Continuous>  simvastatin 40 milliGRAM(s) Oral at bedtime    MEDICATIONS  (PRN):  acetaminophen   Tablet .. 650 milliGRAM(s) Oral every 6 hours PRN Temp greater or equal to 38C (100.4F)  dextrose 40% Gel 15 Gram(s) Oral once PRN Blood Glucose LESS THAN 70 milliGRAM(s)/deciliter  glucagon  Injectable 1 milliGRAM(s) IntraMuscular once PRN Glucose LESS THAN 70 milligrams/deciliter

## 2020-04-15 NOTE — PROGRESS NOTE ADULT - ASSESSMENT
74 y/o M admitted for COVID infection now w/acute hypoxemic respiratory failure secondary to ARDS in setting of COVID infection. Hypotension likely secondary to severe sepsis with septic shock. DESMOND likely secondary to ATN/sepsis/COVID. Hyponatremia resolved.     ## Respiratory failure  - continue on ventilator support  -wean as tolerated  - continue sedation  - stop nimbex      ## Covid  -completed treatment    ##Renal  -monitor renal function   -continue layton catheter   -continue bumex    ## Glycemic control   - sliding scale insulin

## 2020-04-15 NOTE — PROGRESS NOTE ADULT - SUBJECTIVE AND OBJECTIVE BOX
HPI:  Patient is a 74YO M with a PMH of hypothryoidism and HLD who presents to the ED for fever and dyspena.  Patient admitted for evaluation for COVID19.  Has had symptoms for 7 days.  Symptoms include fever, chills, cough, generalized weakness and   increased dyspnea.  Cough has been nonproductive.  Denies hx of nausea, vomiting, diarrhea.  Denies prior testing for COVID19 and   denies recent exposure to anyone with known COVID19.  Nonsmoker, NKDA.  Currently 91% on NRB.  Febrile to 101.8. in ED.  Labs benign.  CXR shows bilateral opacities.  Will admit to floor. (05 Apr 2020 01:40)      24 hr events: No acute events    ## ROS:  [x ] unable to obtain    ## Vitals  ICU Vital Signs Last 24 Hrs  T(C): 37.6 (15 Apr 2020 16:19), Max: 37.6 (15 Apr 2020 16:19)  T(F): 99.6 (15 Apr 2020 16:19), Max: 99.6 (15 Apr 2020 16:19)  HR: 72 (15 Apr 2020 18:00) (69 - 78)  BP: 113/61 (15 Apr 2020 18:00) (94/57 - 127/69)  BP(mean): 71 (15 Apr 2020 18:00) (63 - 83)  ABP: --  ABP(mean): --  RR: 20 (15 Apr 2020 18:00) (20 - 20)  SpO2: 97% (15 Apr 2020 18:00) (91% - 97%)      ## Vent Data  Mode: AC/ CMV (Assist Control/ Continuous Mandatory Ventilation)  RR (machine): 20  TV (machine): 450  FiO2: 50  PEEP: 12  ITime: 1  MAP: 18  PIP: 32      ## Labs:  Chem:                      13.4   10.21 )-----------( 187      ( 15 Apr 2020 03:52 )             41.1     04-15    139  |  94<L>  |  114<H>  ----------------------------<  319<H>  4.0   |  34<H>  |  3.42<H>    Ca    7.5<L>      15 Apr 2020 03:52  Phos  8.5     04-15  Mg     3.5     04-15    TPro  6.7  /  Alb  2.5<L>  /  TBili  0.4  /  DBili  x   /  AST  26  /  ALT  34  /  AlkPhos  66  04-15        CAPILLARY BLOOD GLUCOSE      POCT Blood Glucose.: 238 mg/dL (15 Apr 2020 17:12)  POCT Blood Glucose.: 286 mg/dL (15 Apr 2020 11:29)  POCT Blood Glucose.: 302 mg/dL (15 Apr 2020 08:35)  POCT Blood Glucose.: 289 mg/dL (15 Apr 2020 05:49)  POCT Blood Glucose.: 301 mg/dL (14 Apr 2020 21:53)            ## Cardiac        ## Blood Gas  ABG - ( 12 Apr 2020 13:03 )  pH, Arterial: x     pH, Blood: 7.38  /  pCO2: 53    /  pO2: 95    / HCO3: 31    / Base Excess: 4.8   /  SaO2: 95            #I/Os  I&O's Detail    13 Apr 2020 07:01  -  14 Apr 2020 07:00  --------------------------------------------------------  IN:    bumetanide Infusion: 331 mL    cisatracurium Infusion: 119.7 mL    Enteral Tube Flush: 150 mL    fentaNYL Infusion.: 336 mL    Glucerna: 300 mL    midazolam Infusion: 120 mL    norepinephrine Infusion: 141.1 mL    propofol Infusion: 302.4 mL  Total IN: 1800.2 mL    OUT:  L      14 Apr 2020 07:01  -  14 Apr 2020 08:44  --------------------------------------------------------  IN:    bumetanide Infusion: 20 mL    cisatracurium Infusion: 8.4 mL    fentaNYL Infusion.: 28 mL    midazolam Infusion: 10 mL    propofol Infusion: 25.2 mL  Total IN: 91.6 mL    OUT:      ## Imaging:    ## Medications:  MEDICATIONS  (STANDING):  buMETAnide Infusion 2 mG/Hr (10 mL/Hr) IV Continuous <Continuous>  chlorhexidine 0.12% Liquid 15 milliLiter(s) Oral Mucosa every 12 hours  chlorhexidine 4% Liquid 1 Application(s) Topical <User Schedule>  cisatracurium Infusion 3 MICROgram(s)/kG/Min (13.1 mL/Hr) IV Continuous <Continuous>  dextrose 5%. 1000 milliLiter(s) (50 mL/Hr) IV Continuous <Continuous>  dextrose 50% Injectable 12.5 Gram(s) IV Push once  dextrose 50% Injectable 25 Gram(s) IV Push once  dextrose 50% Injectable 25 Gram(s) IV Push once  enoxaparin Injectable 30 milliGRAM(s) SubCutaneous two times a day  fentaNYL   Infusion. 0.5 MICROgram(s)/kG/Hr (3.63 mL/Hr) IV Continuous <Continuous>  insulin glargine Injectable (LANTUS) 20 Unit(s) SubCutaneous two times a day  insulin lispro (HumaLOG) corrective regimen sliding scale   SubCutaneous every 6 hours  levothyroxine Injectable 25 MICROGram(s) IV Push at bedtime  methylPREDNISolone sodium succinate Injectable 40 milliGRAM(s) IV Push every 12 hours  midazolam Infusion 0.02 mG/kG/Hr (1.45 mL/Hr) IV Continuous <Continuous>  midazolam Injectable 4 milliGRAM(s) IV Push once  midodrine 10 milliGRAM(s) Oral every 8 hours  norepinephrine Infusion 0.05 MICROgram(s)/kG/Min (6.81 mL/Hr) IV Continuous <Continuous>  propofol Infusion 10 MICROgram(s)/kG/Min (4.36 mL/Hr) IV Continuous <Continuous>  simvastatin 40 milliGRAM(s) Oral at bedtime    MEDICATIONS  (PRN):  acetaminophen   Tablet .. 650 milliGRAM(s) Oral every 6 hours PRN Temp greater or equal to 38C (100.4F)  dextrose 40% Gel 15 Gram(s) Oral once PRN Blood Glucose LESS THAN 70 milliGRAM(s)/deciliter  glucagon  Injectable 1 milliGRAM(s) IntraMuscular once PRN Glucose LESS THAN 70 milligrams/deciliter

## 2020-04-17 NOTE — CHART NOTE - NSCHARTNOTEFT_GEN_A_CORE
Communicated with patient's family by phone (wife Christie at [phone] and Niece Adela at [phone]).  Current status and plan reviewed.  Questions answered.

## 2020-04-17 NOTE — DIETITIAN INITIAL EVALUATION ADULT. - ADD RECOMMEND
1) change TF to Nepro with TF bolus of 150mL four times daily and 4pkts prosource TF 2) add MVI with minerals daily to ensure 100% RDI met 3) monitor hydration status; add free water flushes as IV med decrease 4) monitor TF tolerance; keep back of bed > 35 degrees 5) consider supplementing with 100mg thiamine as pt is likely deficient given malnutrition

## 2020-04-17 NOTE — DIETITIAN INITIAL EVALUATION ADULT. - PERTINENT MEDS FT
MEDICATIONS  (STANDING):  chlorhexidine 0.12% Liquid 15 milliLiter(s) Oral Mucosa every 12 hours  chlorhexidine 4% Liquid 1 Application(s) Topical <User Schedule>  dextrose 5%. 1000 milliLiter(s) (50 mL/Hr) IV Continuous <Continuous>  dextrose 50% Injectable 12.5 Gram(s) IV Push once  dextrose 50% Injectable 25 Gram(s) IV Push once  dextrose 50% Injectable 25 Gram(s) IV Push once  fentaNYL   Infusion. 3 MICROgram(s)/kG/Hr (10.7 mL/Hr) IV Continuous <Continuous>  heparin  Injectable 5000 Unit(s) SubCutaneous every 8 hours  insulin glargine Injectable (LANTUS) 20 Unit(s) SubCutaneous two times a day  insulin lispro (HumaLOG) corrective regimen sliding scale   SubCutaneous every 6 hours  levothyroxine 25 MICROGram(s) Oral daily  midodrine 10 milliGRAM(s) Oral every 8 hours  pantoprazole  Injectable 40 milliGRAM(s) IV Push daily  polyethylene glycol 3350 17 Gram(s) Oral daily  propofol Infusion 30 MICROgram(s)/kG/Min (12.6 mL/Hr) IV Continuous <Continuous>  simvastatin 40 milliGRAM(s) Oral at bedtime    MEDICATIONS  (PRN):  acetaminophen   Tablet .. 650 milliGRAM(s) Oral every 6 hours PRN Temp greater or equal to 38.5C (101.3F)  dextrose 40% Gel 15 Gram(s) Oral once PRN Blood Glucose LESS THAN 70 milliGRAM(s)/deciliter  glucagon  Injectable 1 milliGRAM(s) IntraMuscular once PRN Glucose LESS THAN 70 milligrams/deciliter

## 2020-04-17 NOTE — PROGRESS NOTE ADULT - SUBJECTIVE AND OBJECTIVE BOX
74yo M admitted to [facility] ([place] 4/4 - COVID-19 POSITIVE   intubated 4/11 due to acute hypoxic respiratory failure  course complicated by Acute renal failure and Hyponatremia  Transferred to  on 4/17.  Pt on Bumex drip at time of transfer...    Completed course of Plaquenil/Steroids/Anakinra    pmhx Hypothyroidism and HLD       4/17: Bumex drip discontinued.  Remains intubated/sedated.  Failed PSV trial today.    (+) CVL  (+) Luevano    (+) TF    22/450/50 +8    fent/diprivan changed to precedex     Vital signs / Reviewed and Physical Exam Performed where pertinent and urgently required    ICU Vital Signs Last 24 Hrs  T(C): 38 (17 Apr 2020 20:00), Max: 38.6 (17 Apr 2020 15:30)  T(F): 100.4 (17 Apr 2020 20:00), Max: 101.4 (17 Apr 2020 15:30)  HR: 129 (17 Apr 2020 22:00) (86 - 134)  BP: 117/70 (17 Apr 2020 22:00) (85/55 - 174/77)  BP(mean): 81 (17 Apr 2020 22:00) (62 - 113)  RR: 29 (17 Apr 2020 22:00) (18 - 32)  SpO2: 94% (17 Apr 2020 22:00) (88% - 100%)      Lab / Radiology  studies / ABG / Meds -  reviewed and interpreted into the assessment and treatment plan.                            13.6   26.14 )-----------( 150      ( 17 Apr 2020 08:05 )             41.8     17 Apr 2020 08:05    138    |  95     |  161    ----------------------------<  201    4.5     |  35     |  4.24     Ca    7.8        17 Apr 2020 08:05  Phos  8.9       17 Apr 2020 08:05  Mg     3.7       17 Apr 2020 08:05    TPro  6.7    /  Alb  2.5    /  TBili  0.5    /  DBili  x      /  AST  56     /  ALT  42     /  AlkPhos  66     17 Apr 2020 08:05      CAPILLARY BLOOD GLUCOSE      POCT Blood Glucose.: 300 mg/dL (17 Apr 2020 20:24)  POCT Blood Glucose.: 266 mg/dL (17 Apr 2020 17:20)  POCT Blood Glucose.: 217 mg/dL (17 Apr 2020 12:25)  POCT Blood Glucose.: 141 mg/dL (17 Apr 2020 06:51)    LIVER FUNCTIONS - ( 17 Apr 2020 08:05 )  Alb: 2.5 g/dL / Pro: 6.7 gm/dL / ALK PHOS: 66 U/L / ALT: 42 U/L / AST: 56 U/L / GGT: x             Hemoglobin A1C, Whole Blood: 7.7 % (04-12 @ 11:36)      Assessment/Plan/Therapeutic interventions    74yo M admitted to [facility] ([place] 4/4 -   COVID-19 POSITIVE   intubated 4/11   Transferred to  on 4/17.    ARDS due to COVID-19 infection  Acute hypoxic respiratory failure  COVID-19 viral pneumonia  Septic shock due to COVID-19 infection  DESMOND due to ATN - worsening renal function  hyponatremia - improved    pt remains at high risk for deterioration, morbidity and mortality.    Plan for continued care in ICU  Neuro - Propofol and Fentanyl transition to Precedex gtt to facilitate safe ventilation and allow SAT/SBT  Pulm -  Vent Settings on arrival: 20/450/.6/+12. Check ABG reviewed   continue ARDS-NET 4-6cc/kg IBW TV as able to maintain plateau pressures <30               No need for prone vent at this time  Vent bundle to prevent VAP  GI -  PPI  Enteric feeds   Renal - DESMOND with oliguria; D/c Bumex infusion as worsening creatinine noted.  Nephrology eval for potential HD.  No acute indication at present but likely will progress.  Heme -  Pharmacologic DVT PPx  in addition to SCD's  ID - COVID 19+ s/p course of Plaquenil/Anakinra/Steroids.   No role for ABX at this time based on clinical features, culture data, and judicious procalcitonin monitoring.    Endo -  Aggressive glycemic control to limit FS glucose to < 180mg/dl.      COVID 19 specific considerations and therapeutic  options based on the available and rapidly changing literature    Goals of care considerations:  Ongoing assessment for patient specific treatment options based on progression or decline.  I have involved the family with updates and requests in guidance for medical decision making.          38  Minutes of critical care tiem spent in the management of this critically ill COVID-19 patient/PUI patient with continuous assessments and interventions based on the interpretation of multiple databases.  \ 74yo M admitted to [facility] 4/4 - COVID-19 POSITIVE   intubated 4/11 due to acute hypoxic respiratory failure  course complicated by Acute renal failure and Hyponatremia  Transferred to  on 4/17.  Pt on Bumex drip at time of transfer...    Completed course of Plaquenil/Steroids/Anakinra    pmhx Hypothyroidism and HLD       4/17: Bumex drip discontinued.  Remains intubated/sedated.  Failed PSV trial today.    (+) CVL  (+) Luevano    (+) TF    22/450/50 +8    fent/diprivan changed to precedex     Vital signs / Reviewed and Physical Exam Performed where pertinent and urgently required    ICU Vital Signs Last 24 Hrs  T(C): 38 (17 Apr 2020 20:00), Max: 38.6 (17 Apr 2020 15:30)  T(F): 100.4 (17 Apr 2020 20:00), Max: 101.4 (17 Apr 2020 15:30)  HR: 129 (17 Apr 2020 22:00) (86 - 134)  BP: 117/70 (17 Apr 2020 22:00) (85/55 - 174/77)  BP(mean): 81 (17 Apr 2020 22:00) (62 - 113)  RR: 29 (17 Apr 2020 22:00) (18 - 32)  SpO2: 94% (17 Apr 2020 22:00) (88% - 100%)      Lab / Radiology  studies / ABG / Meds -  reviewed and interpreted into the assessment and treatment plan.                            13.6   26.14 )-----------( 150      ( 17 Apr 2020 08:05 )             41.8     17 Apr 2020 08:05    138    |  95     |  161    ----------------------------<  201    4.5     |  35     |  4.24     Ca    7.8        17 Apr 2020 08:05  Phos  8.9       17 Apr 2020 08:05  Mg     3.7       17 Apr 2020 08:05    TPro  6.7    /  Alb  2.5    /  TBili  0.5    /  DBili  x      /  AST  56     /  ALT  42     /  AlkPhos  66     17 Apr 2020 08:05      CAPILLARY BLOOD GLUCOSE      POCT Blood Glucose.: 300 mg/dL (17 Apr 2020 20:24)  POCT Blood Glucose.: 266 mg/dL (17 Apr 2020 17:20)  POCT Blood Glucose.: 217 mg/dL (17 Apr 2020 12:25)  POCT Blood Glucose.: 141 mg/dL (17 Apr 2020 06:51)    LIVER FUNCTIONS - ( 17 Apr 2020 08:05 )  Alb: 2.5 g/dL / Pro: 6.7 gm/dL / ALK PHOS: 66 U/L / ALT: 42 U/L / AST: 56 U/L / GGT: x             Hemoglobin A1C, Whole Blood: 7.7 % (04-12 @ 11:36)      Assessment/Plan/Therapeutic interventions    74yo M admitted to [facility] 4/4 -   COVID-19 POSITIVE   intubated 4/11   Transferred to  on 4/17.    ARDS due to COVID-19 infection  Acute hypoxic respiratory failure  COVID-19 viral pneumonia  Septic shock due to COVID-19 infection  DESMOND due to ATN - worsening renal function  hyponatremia - improved    pt remains at high risk for deterioration, morbidity and mortality.    Plan for continued care in ICU  Neuro - Propofol and Fentanyl transition to Precedex gtt to facilitate safe ventilation and allow SAT/SBT  Pulm -  Vent Settings on arrival: 20/450/.6/+12. Check ABG reviewed   continue ARDS-NET 4-6cc/kg IBW TV as able to maintain plateau pressures <30               No need for prone vent at this time  Vent bundle to prevent VAP  GI -  PPI  Enteric feeds   Renal - DESMOND with oliguria; D/c Bumex infusion as worsening creatinine noted.  Nephrology eval for potential HD.  No acute indication at present but likely will progress.  Heme -  Pharmacologic DVT PPx  in addition to SCD's  ID - COVID 19+ s/p course of Plaquenil/Anakinra/Steroids.   No role for ABX at this time based on clinical features, culture data, and judicious procalcitonin monitoring.    Endo -  Aggressive glycemic control to limit FS glucose to < 180mg/dl.      COVID 19 specific considerations and therapeutic  options based on the available and rapidly changing literature    Goals of care considerations:  Ongoing assessment for patient specific treatment options based on progression or decline.  I have involved the family with updates and requests in guidance for medical decision making.      CRITICAL CARE TIME SPENT:  45 minutes of critical care time spent providing medical care for patient's acute illness/conditions that impairs at least one vital organ system and/or poses a high risk of imminent or life threatening deterioration in the patient's condition. It includes time spent evaluating and treating the patient's acute illness as well as time spent reviewing labs, radiology, discussing goals of care with patient and/or patient's family, and discussing the case with a multidisciplinary team in an effort to prevent further life threatening deterioration or end organ damage. This time is independent of any procedures performed.

## 2020-04-17 NOTE — DIETITIAN INITIAL EVALUATION ADULT. - ENTERAL
change TF to Nepro with TF bolus of 150mL four times daily and 4pkts prosource TF (=1240Kcal, 93g protein, 436mL free water, and total TF volume of 600mL)

## 2020-04-17 NOTE — CHART NOTE - NSCHARTNOTEFT_GEN_A_CORE
Upon Nutritional Assessment by the Registered Dietitian your patient was determined to meet criteria / has evidence of the following diagnosis/diagnoses:          [ ]  Mild Protein Calorie Malnutrition        [x]  Moderate Protein Calorie Malnutrition        [ ] Severe Protein Calorie Malnutrition        [ ] Unspecified Protein Calorie Malnutrition        [ ] Underweight / BMI <19        [ ] Morbid Obesity / BMI > 40      Findings:  moderate malnutrition in acute illness r/t COVID AEB 12 days meeting <50% of ENN, mild edema    Findings as based on:  •  Comprehensive nutrition assessment and consultation  •  Calorie counts (nutrient intake analysis)  •  Food acceptance and intake status from observations by staff  •  Follow up  •  Patient education  •  Intervention secondary to interdisciplinary rounds  •   concerns      Treatment:    The following diet has been recommended:  1) change TF to Nepro with TF bolus of 150mL four times daily and 4pkts prosource TF   2) add MVI with minerals daily to ensure 100% RDI met   3) monitor hydration status; add free water flushes as IV med decrease   4) monitor TF tolerance; keep back of bed > 35 degrees   5) consider supplementing with 100mg thiamine as pt is likely deficient given malnutrition    PROVIDER Section:     By signing this assessment you are acknowledging and agree with the diagnosis/diagnoses assigned by the Registered Dietitian    Comments:

## 2020-04-17 NOTE — DIETITIAN INITIAL EVALUATION ADULT. - MALNUTRITION
moderate malnutrition in acute illness r/t COVID AEB 12 days meeting <50% of ENN, mild edema moderate malnutrition in acute illness

## 2020-04-17 NOTE — DIETITIAN INITIAL EVALUATION ADULT. - PERTINENT LABORATORY DATA
04-17 Na138 mmol/L Glu 201 mg/dL<H> K+ 4.5 mmol/L Cr  4.24 mg/dL<H>  mg/dL<H> Phos 8.9 mg/dL<H> Alb 2.5 g/dL<L> PAB n/a

## 2020-04-17 NOTE — PROGRESS NOTE ADULT - SUBJECTIVE AND OBJECTIVE BOX
Patient is a 75y old  Male who presents with a chief complaint of     BRIEF HOSPITAL COURSE: 75y Male pmhx Hypothyroidism and HLD presented to [facility] 4/4 with complaints of SOB/Fever/Chills found to be COVID 19+. Emergently intubated for acute hypoxic respiratory failure 4/11. Completed course of Plaquenil/Steroids/Anakinra. Hospital course complicated by Acute renal failure and Hyponatremia. Patient arrived as transfer tonight sedated on ventilator.    PAST MEDICAL & SURGICAL HISTORY:  Thyroid disease  No significant past surgical history        Vital signs / Reviewed and Physical Exam Performed where pertinent and urgently required    Lab / Radiology  studies / ABG / Meds -  reviewed and interpreted into the assessment and treatment plan.      Assessment/Plan/Therapeutic interventions      Neuro - Propofol and Fentanyl gtt to facilitate safe ventilation    CV -  Pressor support as needed to maintain MAP 65           Avoiding fluid challenges         Pulm -  Vent Settings on arrival: 20/450/.6/+12. Check ABG to evaluate respiratory status. ARDS-NET 4-6cc/kg IBW TV as able to maintain plateau pressures <30               Prone ventilation consideration as feasible  Pa02/Fi02 < 150 on Fi02 >60% and PEEP at least 5                 Vent bundle Reviewed     GI -  PPI  Enteric feeds as tolerated in tandem with NMB and prone ventilation    Renal - DESMOND with oliguria; Bumex gtt running to convet. UO adequate at this time. Even to negative fluid balance as tolerated by hemodynamics and renal fx.  Feeds to be provided in lieu of IVF.     Heme -  Pharmacologic DVT PPx  in addition to SCD's    ID - COVID 19+ s/p course of Plaquenil/Anakinra/Steroids. ABX discontinuation based on discussion with ID in conjunction with clinical features, culture data, and judicious procalcitonin monitoring.      Endo -  Aggressive glycemic control to limit FS glucose to < 180mg/dl.      COVID 19 specific considerations and therapeutic  options based on the available and rapidly changing literature    Goals of care considerations:  Ongoing assessment for patient specific treatment options based on progression or decline.  I have involved the family with updates and requests in guidance for medical decision making.          38  Minutes of critical care tiem spent in the management of this critically ill COVID-19 patient/PUI patient with continuous assessments and interventions based on the interpretation of multiple databases.  \

## 2020-04-17 NOTE — DIETITIAN INITIAL EVALUATION ADULT. - NAME AND PHONE
Margarette Albert MA, RDN, CDN, [facility]  [phone] (office number)  [phone] (pager number)

## 2020-04-17 NOTE — DIETITIAN INITIAL EVALUATION ADULT. - OTHER INFO
76yo male transferred from  with PMH significant for hypothyroidisma and HLD p/w SOB/fever/chills and found to be COVID-19 (+).  s/p emergently intubated for acute hypoxic resp failure on 4/11.  hospital course c/b acute renal failure and hyponatremia. pt was on propofol, now titrated off.      *at , pt had been NPO/clr liquid diet for 4 days documented on 4/9; pt either NPO or minimal TF from 4/9 to 4/17.  Pt meeting <50% of ENN x 12 days.    *labs reviewed; hypermagnesemia.  hyperphosphatemia.  potassium currently WNL.  BUN and Cr elevated.  hyperglycemia with A1C of 7.7 on 4/12, on lantus and humalog.  triglycerides of 148 on 4/16.  *pt with urine output, now s/p layton, with 375mL since this AM.  no BM documented at  and none since arrival at [facility] (11 days), rec'd add bowel regimen.    *admit wt of 72.8kg on 4/5 to .  Not significant change noted.

## 2020-04-17 NOTE — DIETITIAN INITIAL EVALUATION ADULT. - PHYSICAL APPEARANCE
other (specify) pt with visible moderate temporal/clavicle muscle wasting.  NFPE not able to be completed 2/2 isolation protocol.  (+1) generalized.  (+2) Lt/Rt hand edema.  amilcar score of 10, stage 1 PU on sacrum.

## 2020-04-18 NOTE — PROGRESS NOTE ADULT - SUBJECTIVE AND OBJECTIVE BOX
74yo M admitted to [facility] 4/4 - COVID-19 POSITIVE   intubated 4/11 due to acute hypoxic respiratory failure  course complicated by Acute renal failure and Hyponatremia  Transferred to  on 4/17.  Pt on Bumex drip at time of transfer...    Completed course of Plaquenil/Steroids/Anakinra    pmhx Hypothyroidism and HLD       4/17: Bumex drip discontinued.  Remains intubated/sedated.  Failed PSV trial today.    4/18: case d/w nephrology - No acute indication for HD at this time.  Lantus increased due to sig hyperglycemia.      (+) CVL  (+) Luevano    (+) TF    20/450/60 +7.5  7.48/55/68  P:F 113    PEEP increased to 12  FiO2 increased to     fent/diprivan     Vital signs / Reviewed and Physical Exam Performed where pertinent and urgently required    ICU Vital Signs Last 24 Hrs  T(C): 38 (17 Apr 2020 20:00), Max: 38.6 (17 Apr 2020 15:30)  T(F): 100.4 (17 Apr 2020 20:00), Max: 101.4 (17 Apr 2020 15:30)  HR: 129 (17 Apr 2020 22:00) (86 - 134)  BP: 117/70 (17 Apr 2020 22:00) (85/55 - 174/77)  BP(mean): 81 (17 Apr 2020 22:00) (62 - 113)  RR: 29 (17 Apr 2020 22:00) (18 - 32)  SpO2: 94% (17 Apr 2020 22:00) (88% - 100%)      Lab / Radiology  studies / ABG / Meds -  reviewed and interpreted into the assessment and treatment plan.                            13.6   26.14 )-----------( 150      ( 17 Apr 2020 08:05 )             41.8     17 Apr 2020 08:05    138    |  95     |  161    ----------------------------<  201    4.5     |  35     |  4.24     Ca    7.8        17 Apr 2020 08:05  Phos  8.9       17 Apr 2020 08:05  Mg     3.7       17 Apr 2020 08:05    TPro  6.7    /  Alb  2.5    /  TBili  0.5    /  DBili  x      /  AST  56     /  ALT  42     /  AlkPhos  66     17 Apr 2020 08:05      CAPILLARY BLOOD GLUCOSE      POCT Blood Glucose.: 300 mg/dL (17 Apr 2020 20:24)  POCT Blood Glucose.: 266 mg/dL (17 Apr 2020 17:20)  POCT Blood Glucose.: 217 mg/dL (17 Apr 2020 12:25)  POCT Blood Glucose.: 141 mg/dL (17 Apr 2020 06:51)    LIVER FUNCTIONS - ( 17 Apr 2020 08:05 )  Alb: 2.5 g/dL / Pro: 6.7 gm/dL / ALK PHOS: 66 U/L / ALT: 42 U/L / AST: 56 U/L / GGT: x             Hemoglobin A1C, Whole Blood: 7.7 % (04-12 @ 11:36)      Assessment/Plan/Therapeutic interventions    74yo M admitted to [facility] 4/4 -   COVID-19 POSITIVE   intubated 4/11   Transferred to  on 4/17.    ARDS due to COVID-19 infection  Acute hypoxic respiratory failure  COVID-19 viral pneumonia  Septic shock due to COVID-19 infection  DESMOND due to ATN - worsening renal function  hyponatremia - improved    pt remains at high risk for deterioration, morbidity and mortality.    Plan for continued care in ICU  Neuro - Propofol and Fentanyl transition to Precedex gtt to facilitate safe ventilation and allow SAT/SBT  Pulm -  Vent Settings on arrival: 20/450/.6/+12. Check ABG reviewed   continue ARDS-NET 4-6cc/kg IBW TV as able to maintain plateau pressures <30               No need for prone vent at this time  Vent bundle to prevent VAP  GI -  PPI  Enteric feeds   Renal - DESMOND with oliguria; D/c Bumex infusion as worsening creatinine noted.  Nephrology eval for potential HD.  No acute indication at present but likely will progress.  Heme -  Pharmacologic DVT PPx  in addition to SCD's  ID - COVID 19+ s/p course of Plaquenil/Anakinra/Steroids.   No role for ABX at this time based on clinical features, culture data, and judicious procalcitonin monitoring.    Endo -  Aggressive glycemic control to limit FS glucose to < 180mg/dl.      COVID 19 specific considerations and therapeutic  options based on the available and rapidly changing literature    Goals of care considerations:  Ongoing assessment for patient specific treatment options based on progression or decline.  I have involved the family with updates and requests in guidance for medical decision making.      CRITICAL CARE TIME SPENT:  45 minutes of critical care time spent providing medical care for patient's acute illness/conditions that impairs at least one vital organ system and/or poses a high risk of imminent or life threatening deterioration in the patient's condition. It includes time spent evaluating and treating the patient's acute illness as well as time spent reviewing labs, radiology, discussing goals of care with patient and/or patient's family, and discussing the case with a multidisciplinary team in an effort to prevent further life threatening deterioration or end organ damage. This time is independent of any procedures performed. 74yo M admitted to [facility] 4/4 - COVID-19 POSITIVE   intubated 4/11 due to acute hypoxic respiratory failure  course complicated by Acute renal failure and Hyponatremia  Transferred to  on 4/17.  Pt on Bumex drip at time of transfer...    Completed course of Plaquenil/Steroids/Anakinra    pmhx Hypothyroidism and HLD       4/17: Bumex drip discontinued.  Remains intubated/sedated.  Failed PSV trial today.    4/18: case d/w nephrology - No acute indication for HD at this time.  Lantus increased due to sig hyperglycemia.      (+) CVL  (+) Luevano    (+) TF    20/450/60 +7.5  7.48/55/68  P:F 113    PEEP increased to 12  FiO2 increased to 70%    pt with episodes of tachycardia and hypotension  treated with IVF 1000ml bolus earlier  will now repeat bolus and initiate phenylephrine infusion to support MAP    fent/diprivan     Vital signs / Reviewed and Physical Exam Performed where pertinent and urgently required  ICU Vital Signs Last 24 Hrs  T(C): 36.7 (18 Apr 2020 12:00), Max: 38 (17 Apr 2020 20:00)  T(F): 98 (18 Apr 2020 12:00), Max: 100.4 (17 Apr 2020 20:00)  HR: 117 (18 Apr 2020 15:00) (68 - 137)  BP: 92/53 (18 Apr 2020 15:00) (78/49 - 164/68)  BP(mean): 60 (18 Apr 2020 15:00) (56 - 97)  RR: 33 (18 Apr 2020 15:00) (21 - 42)  SpO2: 90% (18 Apr 2020 15:00) (88% - 97%)    Lab / Radiology  studies / ABG / Meds -  reviewed and interpreted into the assessment and treatment plan.                         13.5   17.86 )-----------( 114      ( 18 Apr 2020 07:11 )             41.4     18 Apr 2020 07:11    141    |  99     |  178    ----------------------------<  331    3.7     |  33     |  3.59     Ca    8.6        18 Apr 2020 07:11  Phos  6.2       18 Apr 2020 07:11  Mg     4.0       18 Apr 2020 07:11    TPro  6.8    /  Alb  2.2    /  TBili  0.6    /  DBili  x      /  AST  57     /  ALT  38     /  AlkPhos  67     18 Apr 2020 07:11      CAPILLARY BLOOD GLUCOSE      POCT Blood Glucose.: 300 mg/dL (18 Apr 2020 11:58)  POCT Blood Glucose.: 285 mg/dL (18 Apr 2020 08:28)  POCT Blood Glucose.: 336 mg/dL (18 Apr 2020 05:20)  POCT Blood Glucose.: 350 mg/dL (17 Apr 2020 23:20)  POCT Blood Glucose.: 300 mg/dL (17 Apr 2020 20:24)  POCT Blood Glucose.: 266 mg/dL (17 Apr 2020 17:20)    LIVER FUNCTIONS - ( 18 Apr 2020 07:11 )  Alb: 2.2 g/dL / Pro: 6.8 gm/dL / ALK PHOS: 67 U/L / ALT: 38 U/L / AST: 57 U/L / GGT: x               Assessment/Plan/Therapeutic interventions    74yo M admitted to [facility] 4/4 -   COVID-19 POSITIVE   intubated 4/11   Transferred to  on 4/17.    ARDS due to COVID-19 infection  Acute hypoxic respiratory failure  COVID-19 viral pneumonia  Septic shock due to COVID-19 infection  DESMOND due to ATN - worsening renal function  hyponatremia - improved    tachycardia, hypotension - recurrent septic shock    pt remains at high risk for deterioration, morbidity and mortality.    Plan for continued care in ICU  repeat ABG to assess PF ratio - if remains < 150 will consider prone ventilation  also treat with 1000ml LR bolus and pressors given hypotension/tachycardia   may be sign of impending fever  nephrology input appreciated will speak with family in regard to GOC    Neuro - Propofol and PRN ativan for now, d/c Precedex gtt until ready for SAT/SBT  Pulm -  continue ARDS-NET 4-6cc/kg IBW TV as able to maintain plateau pressures <30             Vent bundle to prevent VAP  GI -  PPI  Enteric feeds   Renal - DESMOND with oliguria; gentle hydration and monitor Cr/urine output  No acute indication at present but potential to progress given severity of acute illness.  Heme -  Pharmacologic DVT PPx  in addition to SCD's  ID - COVID 19+ s/p course of Plaquenil/Anakinra/Steroids.   No role for ABX at this time based on clinical features, culture data, and judicious procalcitonin monitoring.    Endo -  Aggressive glycemic control to limit FS glucose to < 180mg/dl.      COVID 19 specific considerations and therapeutic  options based on the available and rapidly changing literature    Goals of care considerations:  Ongoing assessment for patient specific treatment options based on progression or decline.  I have involved the family with updates and requests in guidance for medical decision making.      CRITICAL CARE TIME SPENT:  45 minutes of critical care time spent providing medical care for patient's acute illness/conditions that impairs at least one vital organ system and/or poses a high risk of imminent or life threatening deterioration in the patient's condition. It includes time spent evaluating and treating the patient's acute illness as well as time spent reviewing labs, radiology, discussing goals of care with patient and/or patient's family, and discussing the case with a multidisciplinary team in an effort to prevent further life threatening deterioration or end organ damage. This time is independent of any procedures performed.

## 2020-04-18 NOTE — CONSULT NOTE ADULT - SUBJECTIVE AND OBJECTIVE BOX
NEPHROLOGY INTERVAL HPI/OVERNIGHT EVENTS:  JOSÉ MIGUEL [id number]  HPI:  hx from chart  transfer from [facility].   76 yo m with hx of hypothy admitted to [facility] for sob/[id number]:  covid pos on   intubated   has been in ICU with rising scr while on bumex drip with neg fluid balance  upon transfer, pt bumex drip dc'd and given LR bolus  this am on FIO2 of 80% with PEEP 7.5  UOp clear but sluggish       PAST MEDICAL & SURGICAL HISTORY:  Thyroid disease  No significant past surgical history      FAMILY HISTORY:  FH: HTN (hypertension)      MEDICATIONS  (STANDING):  chlorhexidine 0.12% Liquid 15 milliLiter(s) Oral Mucosa every 12 hours  chlorhexidine 4% Liquid 1 Application(s) Topical <User Schedule>  dexMEDEtomidine Infusion 0.2 MICROgram(s)/kG/Hr (3.57 mL/Hr) IV Continuous <Continuous>  dextrose 5%. 1000 milliLiter(s) (50 mL/Hr) IV Continuous <Continuous>  dextrose 50% Injectable 12.5 Gram(s) IV Push once  dextrose 50% Injectable 25 Gram(s) IV Push once  dextrose 50% Injectable 25 Gram(s) IV Push once  heparin  Injectable 5000 Unit(s) SubCutaneous every 8 hours  insulin glargine Injectable (LANTUS) 30 Unit(s) SubCutaneous two times a day  insulin lispro (HumaLOG) corrective regimen sliding scale   SubCutaneous every 6 hours  lactated ringers Bolus 1000 milliLiter(s) IV Bolus once  levothyroxine 25 MICROGram(s) Oral daily  midodrine 10 milliGRAM(s) Oral every 8 hours  pantoprazole  Injectable 40 milliGRAM(s) IV Push daily  polyethylene glycol 3350 17 Gram(s) Oral daily  propofol Infusion 30 MICROgram(s)/kG/Min (12.6 mL/Hr) IV Continuous <Continuous>  simvastatin 40 milliGRAM(s) Oral at bedtime    MEDICATIONS  (PRN):  acetaminophen   Tablet .. 650 milliGRAM(s) Oral every 6 hours PRN Temp greater or equal to 38.5C (101.3F)  dextrose 40% Gel 15 Gram(s) Oral once PRN Blood Glucose LESS THAN 70 milliGRAM(s)/deciliter  glucagon  Injectable 1 milliGRAM(s) IntraMuscular once PRN Glucose LESS THAN 70 milligrams/deciliter      Allergies    No Known Allergies    Intolerances        I&O's Summary      -  2020 07:00  --------------------------------------------------------  IN: 1296 mL / OUT: 1725 mL / NET: -429 mL    :  -  2020 15:54  --------------------------------------------------------  IN: 1180 mL / OUT: 0 mL / NET: 1180 mL        Home Medications:    Vital Signs Last 24 Hrs  T(C): 36.7 (2020 12:00), Max: 38 (2020 20:00)  T(F): 98 (2020 12:00), Max: 100.4 (2020 20:)  HR: 117 (2020 15:) (68 - 137)  BP: 92/53 (2020 15:00) (78/49 - 164/68)  BP(mean): 60 (2020 15:) (56 - 97)  RR: 33 (2020 15:) (21 - 42)  SpO2: 90% (2020 15:) (88% - 97%)  Daily     Daily Weight in k.7 (2020 05:00)  I&O's Summary      -  2020 07:00  --------------------------------------------------------  IN: 1296 mL / OUT: 1725 mL / NET: -429 mL    :  -  2020 15:54  --------------------------------------------------------  IN: 1180 mL / OUT: 0 mL / NET: 1180 mL        PHYSICAL EXAM: COVID 19 exam collaborated with CCM team   GEN: chemically sedated   HEENT: intubated   NECK supple no jvd  CV: RRR  LUNGS: b/l CTA  EXT: no edema    LABS:                        13.5   17.86 )-----------( 114      ( 2020 07:11 )             41.4         141  |  99  |  178  ----------------------------<  331<H>  3.7   |  33<H>  |  3.59<H>    Ca    8.6      2020 07:11  Phos  6.2       Mg     4.0         TPro  6.8  /  Alb  2.2<L>  /  TBili  0.6  /  DBili  x   /  AST  57<H>  /  ALT  38  /  AlkPhos  67          Magnesium, Serum: 4.0 mg/dL ( @ 07:11)  Phosphorus Level, Serum: 6.2 mg/dL ( @ 07:11)    ABG - ( 2020 05:00 )  pH, Arterial: 7.48  pH, Blood: x     /  pCO2: 45    /  pO2: 68    / HCO3: 33    / Base Excess: 8.9   /  SaO2: 93

## 2020-04-18 NOTE — CONSULT NOTE ADULT - ASSESSMENT
74 yo m with hx of hypothy admitted to [facility] for sob/[id number]:  covid pos on 4/05  intubated 4/11  has been in ICU with rising scr while on bumex drip with neg fluid balance  upon transfer, pt bumex drip dc'd and given LR bolus  this am on FIO2 of 80% with PEEP 7.5  UOp clear but sluggish     PLAN  - monitor on off ivf or diuretics, pt overall in neg fluid balance with abg/bmp suggestive of metabolic alkalosis  - no indication for HD for now   - fu uop   luz rn and dr laura whitaker

## 2020-04-19 NOTE — PROGRESS NOTE ADULT - SUBJECTIVE AND OBJECTIVE BOX
NEPHROLOGY INTERVAL HPI/OVERNIGHT EVENTS:  04-19-20 @ 14:39    4/19 MK   dw dr doherty, pt in process of proning,   + uop now on FIO2 with CXR with no evidence of clinical worsening     HPI:  hx from chart  transfer from [place].   76 yo m with hx of hypothy admitted to [place] for sob/mdogec2v:  covid pos on 4/05  intubated 4/11  has been in ICU with rising scr while on bumex drip with neg fluid balance  upon transfer, pt bumex drip dc'd and given LR bolus  this am on FIO2 of 80% with PEEP 7.5  UOp clear but sluggish     MEDICATIONS  (STANDING):  chlorhexidine 0.12% Liquid 15 milliLiter(s) Oral Mucosa every 12 hours  chlorhexidine 4% Liquid 1 Application(s) Topical <User Schedule>  cisatracurium Infusion 3 MICROgram(s)/kG/Min (12.8 mL/Hr) IV Continuous <Continuous>  dextrose 5%. 1000 milliLiter(s) (50 mL/Hr) IV Continuous <Continuous>  dextrose 50% Injectable 12.5 Gram(s) IV Push once  dextrose 50% Injectable 25 Gram(s) IV Push once  dextrose 50% Injectable 25 Gram(s) IV Push once  fentaNYL   Infusion. 0.5 MICROgram(s)/kG/Hr (1.78 mL/Hr) IV Continuous <Continuous>  heparin  Injectable 5000 Unit(s) SubCutaneous every 8 hours  insulin glargine Injectable (LANTUS) 30 Unit(s) SubCutaneous two times a day  insulin lispro (HumaLOG) corrective regimen sliding scale   SubCutaneous every 6 hours  levothyroxine 25 MICROGram(s) Oral daily  midodrine 10 milliGRAM(s) Oral every 8 hours  norepinephrine Infusion 0.05 MICROgram(s)/kG/Min (6.68 mL/Hr) IV Continuous <Continuous>  pantoprazole  Injectable 40 milliGRAM(s) IV Push daily  phenylephrine    Infusion 0.5 MICROgram(s)/kG/Min (6.68 mL/Hr) IV Continuous <Continuous>  polyethylene glycol 3350 17 Gram(s) Oral daily  propofol Infusion 30 MICROgram(s)/kG/Min (12.6 mL/Hr) IV Continuous <Continuous>  simvastatin 40 milliGRAM(s) Oral at bedtime  vasopressin Infusion 0.04 Unit(s)/Min (2.4 mL/Hr) IV Continuous <Continuous>    MEDICATIONS  (PRN):  acetaminophen   Tablet .. 650 milliGRAM(s) Oral every 6 hours PRN Temp greater or equal to 38.5C (101.3F)  dextrose 40% Gel 15 Gram(s) Oral once PRN Blood Glucose LESS THAN 70 milliGRAM(s)/deciliter  glucagon  Injectable 1 milliGRAM(s) IntraMuscular once PRN Glucose LESS THAN 70 milligrams/deciliter  LORazepam   Injectable 2 milliGRAM(s) IV Push every 4 hours PRN Dyssynchrony with ventilator/Agitation      Allergies    No Known Allergies    Intolerances        I&O's Detail    18 Apr 2020 07:01  -  19 Apr 2020 07:00  --------------------------------------------------------  IN:    Free Water: 180 mL    Lactated Ringers IV Bolus: 2000 mL    Nepro: 150 mL    phenylephrine   Infusion: 341 mL    propofol Infusion: 386 mL  Total IN: 3057 mL    OUT:    Indwelling Catheter - Urethral: 600 mL    Voided: 700 mL  Total OUT: 1300 mL    Total NET: 1757 mL      19 Apr 2020 07:01  -  19 Apr 2020 14:39  --------------------------------------------------------  IN:    Free Water: 50 mL    Nepro: 150 mL    NSModularFluidAdult: 2 mL  Total IN: 202 mL    OUT:    Indwelling Catheter - Urethral: 200 mL  Total OUT: 200 mL    Total NET: 2 mL      Vital Signs Last 24 Hrs  T(C): 38 (19 Apr 2020 14:00), Max: 39.7 (19 Apr 2020 00:00)  T(F): 100.4 (19 Apr 2020 14:00), Max: 103.5 (19 Apr 2020 00:00)  HR: 146 (19 Apr 2020 14:38) (106 - 151)  BP: 108/68 (19 Apr 2020 14:38) (75/50 - 185/84)  BP(mean): 75 (19 Apr 2020 14:38) (55 - 107)  RR: 23 (19 Apr 2020 14:38) (18 - 44)  SpO2: 96% (19 Apr 2020 14:38) (85% - 100%)  Daily     Daily     PHYSICAL EXAM: COVID 19 exam collaborated with CCM team   General: chemically sedated  HEENT: intubated   CV:  rrr  LUNGS: B/L CTA  EXT: no edema, scd in place     LABS:                        13.1   19.76 )-----------( 137      ( 19 Apr 2020 06:35 )             40.2     04-19    147<H>  |  104  |  208  ----------------------------<  109<H>  4.0   |  33<H>  |  4.32<H>    Ca    8.3<L>      19 Apr 2020 09:00  Phos  8.4     04-19  Mg     3.6     04-19    TPro  6.6  /  Alb  1.8<L>  /  TBili  0.7  /  DBili  x   /  AST  79<H>  /  ALT  41  /  AlkPhos  75  04-19        Magnesium, Serum: 3.6 mg/dL (04-19 @ 09:00)  Phosphorus Level, Serum: 8.4 mg/dL (04-19 @ 09:00)    ABG - ( 19 Apr 2020 07:03 )  pH, Arterial: 7.34  pH, Blood: x     /  pCO2: 58    /  pO2: 144   / HCO3: 30    / Base Excess: 3.4   /  SaO2: 98

## 2020-04-19 NOTE — PROGRESS NOTE ADULT - SUBJECTIVE AND OBJECTIVE BOX
76yo M admitted to [facility] 4/4 - COVID-19 POSITIVE   intubated 4/11 due to acute hypoxic respiratory failure  course complicated by Acute renal failure and Hyponatremia  Transferred to  on 4/17.  Pt on Bumex drip at time of transfer...    Completed course of Plaquenil/Steroids/Anakinra    pmhx Hypothyroidism and HLD       4/17: Bumex drip discontinued.  Remains intubated/sedated.  Failed PSV trial today.    4/18: case d/w nephrology - No acute indication for HD at this time.  Lantus increased due to sig hyperglycemia.    4/19: pt remains intubated and sedated.  Treated with Rocuronium 50mg IVP x 1 this morning due to desynchrony with vent while prone ventilating   after return to supine position pt desaturating to 86-88% despite 100% P=12  recruitment maneuver peep 20cm x 40 seconds then P=16  seem to improve O2 sat to 93-94%  also requiring initiation of vasopressors - now on 2 agents.    Discussed with pts bogdan Chapman via telephone - she is NP.  All issues/plans/prognosis explained in detail  I also informed her that given high FiO2 and PEEP requirement as well as need for (now 2) vasopressors  CPR would not be undertaken should pt pass despite maximal medical intervention.    (+) Prone => 14:00    (+) CVL  (+) Luevano    (+) TF    Prone:  20/450/100 +12  7.43/58/144  P:F 144    Post-supination PEEP increased to 16 after recruitment maneuver.      (+) phenylephrine  (+) Vasopressin  (+) Levophed     (+) fent/diprivan     Vital signs / Reviewed and Physical Exam Performed where pertinent and urgently required  ICU Vital Signs Last 24 Hrs  T(C): 36.7 (18 Apr 2020 12:00), Max: 38 (17 Apr 2020 20:00)  T(F): 98 (18 Apr 2020 12:00), Max: 100.4 (17 Apr 2020 20:00)  HR: 117 (18 Apr 2020 15:00) (68 - 137)  BP: 92/53 (18 Apr 2020 15:00) (78/49 - 164/68)  BP(mean): 60 (18 Apr 2020 15:00) (56 - 97)  RR: 33 (18 Apr 2020 15:00) (21 - 42)  SpO2: 90% (18 Apr 2020 15:00) (88% - 97%)    Lab / Radiology  studies / ABG / Meds -  reviewed and interpreted into the assessment and treatment plan.                                   13.1   19.76 )-----------( 137      ( 19 Apr 2020 06:35 )             40.2     19 Apr 2020 09:00    147    |  104    |  208    ----------------------------<  109    4.0     |  33     |  4.32     Ca    8.3        19 Apr 2020 09:00  Phos  8.4       19 Apr 2020 09:00  Mg     3.6       19 Apr 2020 09:00    TPro  6.6    /  Alb  1.8    /  TBili  0.7    /  DBili  x      /  AST  79     /  ALT  41     /  AlkPhos  75     19 Apr 2020 09:00      CAPILLARY BLOOD GLUCOSE      POCT Blood Glucose.: 102 mg/dL (19 Apr 2020 11:25)  POCT Blood Glucose.: 137 mg/dL (19 Apr 2020 05:12)  POCT Blood Glucose.: 159 mg/dL (18 Apr 2020 22:51)  POCT Blood Glucose.: 198 mg/dL (18 Apr 2020 17:02)    LIVER FUNCTIONS - ( 19 Apr 2020 09:00 )  Alb: 1.8 g/dL / Pro: 6.6 gm/dL / ALK PHOS: 75 U/L / ALT: 41 U/L / AST: 79 U/L / GGT: x               Assessment/Plan/Therapeutic interventions    76yo M admitted to [facility] 4/4 -   COVID-19 POSITIVE   intubated 4/11   Transferred to  on 4/17.    ARDS due to COVID-19 infection  Acute hypoxic respiratory failure  COVID-19 viral pneumonia  Septic shock due to COVID-19 infection  DESMOND due to ATN - worsening renal function - NO acute indication for HD  hyponatremia - improved  worsening tachycardia, hypotension - recurrent septic shock  Now on multiple vasopressors  worsening hypoxia requiring 100% FiO2 and high PEEP  ?? possibility of DVT/PE as cause of severe hypoxia and worsening hemodynamics  too unstable for transport to CT and contrast would destroy remaining renal function    pt remains at high risk for deterioration, morbidity and mortality.    Plan for continued care in ICU  repeat ABG to assess PF ratio - if remains < 150 again will consider prone ventilation  A-line placement  LE venous duplex assess for possible DVT    nephrology input appreciated     Neuro - Propofol and fentanyl +/- paralytic as needed  Pulm -  continue ARDS-NET 4-6cc/kg IBW TV as able to maintain plateau pressures <30 - may require additional prone ventilation.             Vent bundle to prevent VAP  GI -  PPI  Enteric feeds   Renal - DESMOND with oliguria; gentle hydration and monitor Cr/urine output; consider diuretic if fluid overload problematic  No acute indication at present but potential to progress given severity of acute illness.  Heme -  Pharmacologic DVT PPx  in addition to SCD's  ID - COVID 19+ s/p course of Plaquenil/Anakinra/Steroids.   No role for ABX at this time based on clinical features, culture data, and judicious procalcitonin monitoring.    Endo -  Aggressive glycemic control to limit FS glucose to < 180mg/dl.    Discussed with pts bogdan Chapman via telephone 4/19 - she is NP.  All issues/plans/prognosis explained in detail.  I also informed her that given high FiO2 and PEEP requirement as well as need for (now 2) vasopressors  CPR would NOT be undertaken should pt pass despite maximal medical intervention.    COVID 19 specific considerations and therapeutic  options based on the available and rapidly changing literature    Goals of care considerations:  Ongoing assessment for patient specific treatment options based on progression or decline.  I have involved the family with updates and requests in guidance for medical decision making.      CRITICAL CARE TIME SPENT:  75 minutes of critical care time spent providing medical care for patient's acute illness/conditions that impairs at least one vital organ system and/or poses a high risk of imminent or life threatening deterioration in the patient's condition. It includes time spent evaluating and treating the patient's acute illness as well as time spent reviewing labs, radiology, discussing goals of care with patient and/or patient's family, and discussing the case with a multidisciplinary team in an effort to prevent further life threatening deterioration or end organ damage. This time is independent of any procedures performed.

## 2020-04-19 NOTE — CHART NOTE - NSCHARTNOTEFT_GEN_A_CORE
spoke with pts wife Raissa and Mario Chapman ([phone]) who is NP.    Discussed and updated pts condition, answered all relevant questions.

## 2020-04-19 NOTE — PROGRESS NOTE ADULT - ASSESSMENT
76 yo m with hx of hypothy admitted to [place] for sob/[id number]:  covid pos on 4/05  intubated 4/11  has been in ICU with rising scr while on bumex drip with neg fluid balance  upon transfer, pt bumex drip dc'd and given LR bolus  this am on FIO2 of 80% with PEEP 7.5  UOp clear but sluggish     PLAN  - monitor on off ivf or diuretics, pt overall in neg fluid balance with abg/bmp suggestive of metabolic alkalosis  - no indication for HD for now   - fu uop   luz cobian and dr laura doherty    4/19 MK   - DESMOND nonoliguric     for now with no acute indication for HD   - COVID 19, remains vent dependent    FIO2 100%, PEEP 12  - hypernatremia: fu trend of na and FIO2   luz cobian and dr laura doherty

## 2020-04-19 NOTE — PROCEDURE NOTE - NSPROCDETAILS_GEN_ALL_CORE
sutured in place/positive blood return obtained via catheter/location identified, draped/prepped, sterile technique used, needle inserted/introduced/connected to a pressurized flush line

## 2020-04-20 NOTE — PROGRESS NOTE ADULT - SUBJECTIVE AND OBJECTIVE BOX
EDRS AFFIRMATION    [id number]    :Joseph Chinpawel  Apr-     /Personal Invalid/Medical Valid/Not Registered/Unsigned/Certified/NA/NA/Personal Pending/ICD Coding Required  Affirmations     Authentication successful.

## 2020-04-20 NOTE — DISCHARGE NOTE FOR THE EXPIRED PATIENT - HOSPITAL COURSE
75y Male pmhx Hypothyroidism and HLD presented to [place] [facility] 4/4 with complaints of SOB/Fever/Chills found to be COVID 19+. Emergently intubated for acute hypoxic respiratory failure 4/11. Completed course of Plaquenil/Steroids/Anakinra. Hospital course complicated by Acute renal failure and Hyponatremia. Patient arrived as transfer tonight sedated on ventilator  4/17 4/19: pt remains intubated and sedated. Pt having vent dyssynchrony, Treated with Rocuronium 50mg IVP x 1 this morning due to desynchrony with vent while prone ventilating   after return to supine position pt desaturating to 86-88%, s/p recruitment maneuver, seem to improve O2 sat to 93-94%, also requiring initiation of vasopressor. Declining in triple pressor shock. Prognosis discussed, with niece during day, who is aware of how poorly patient is doing and will not continue with CPR in the setting pt loses pulse     0618 was called to bedside by nurse that pt did not have viable rhythm. I examined the patient and found asystole on monitor, no palpable pulses at carotid and femoral locations, the ventilator was disconnected, no spontaneous breath sounds were auscultated via stethoscope. There were no heart sounds auscultated via stethoscope at left and right sternal boarders as well at PMI, skin was cyanotic and cool and pupils were fixed and dilated without reaction to light. Pt was pronounced dead 0619

## 2020-04-24 DIAGNOSIS — E86.1 HYPOVOLEMIA: ICD-10-CM

## 2020-04-24 DIAGNOSIS — E03.9 HYPOTHYROIDISM, UNSPECIFIED: ICD-10-CM

## 2020-04-24 DIAGNOSIS — U07.1 COVID-19: ICD-10-CM

## 2020-04-24 DIAGNOSIS — R73.9 HYPERGLYCEMIA, UNSPECIFIED: ICD-10-CM

## 2020-04-24 DIAGNOSIS — J12.89 OTHER VIRAL PNEUMONIA: ICD-10-CM

## 2020-04-24 DIAGNOSIS — E87.2 ACIDOSIS: ICD-10-CM

## 2020-04-24 DIAGNOSIS — J80 ACUTE RESPIRATORY DISTRESS SYNDROME: ICD-10-CM

## 2020-04-24 DIAGNOSIS — E87.1 HYPO-OSMOLALITY AND HYPONATREMIA: ICD-10-CM

## 2020-04-24 DIAGNOSIS — N17.0 ACUTE KIDNEY FAILURE WITH TUBULAR NECROSIS: ICD-10-CM

## 2020-04-24 DIAGNOSIS — A41.89 OTHER SPECIFIED SEPSIS: ICD-10-CM

## 2020-04-24 DIAGNOSIS — R65.21 SEVERE SEPSIS WITH SEPTIC SHOCK: ICD-10-CM

## 2020-04-24 DIAGNOSIS — E78.5 HYPERLIPIDEMIA, UNSPECIFIED: ICD-10-CM
